# Patient Record
Sex: FEMALE | Race: WHITE | NOT HISPANIC OR LATINO | Employment: OTHER | URBAN - METROPOLITAN AREA
[De-identification: names, ages, dates, MRNs, and addresses within clinical notes are randomized per-mention and may not be internally consistent; named-entity substitution may affect disease eponyms.]

---

## 2017-08-24 ENCOUNTER — HOSPITAL ENCOUNTER (EMERGENCY)
Facility: HOSPITAL | Age: 52
Discharge: HOME | End: 2017-08-24
Attending: EMERGENCY MEDICINE | Admitting: EMERGENCY MEDICINE

## 2017-08-24 VITALS
BODY MASS INDEX: 42.34 KG/M2 | DIASTOLIC BLOOD PRESSURE: 67 MMHG | OXYGEN SATURATION: 98 % | HEIGHT: 64 IN | TEMPERATURE: 98 F | SYSTOLIC BLOOD PRESSURE: 128 MMHG | WEIGHT: 248 LBS | RESPIRATION RATE: 16 BRPM | HEART RATE: 63 BPM

## 2017-08-24 DIAGNOSIS — S51.812A LACERATION OF FOREARM, LEFT, INITIAL ENCOUNTER: Primary | ICD-10-CM

## 2017-08-24 PROCEDURE — 99282 EMERGENCY DEPT VISIT SF MDM: CPT | Mod: 25,,, | Performed by: EMERGENCY MEDICINE

## 2017-08-24 PROCEDURE — 12004 RPR S/N/AX/GEN/TRK7.6-12.5CM: CPT

## 2017-08-24 PROCEDURE — 12034 INTMD RPR S/TR/EXT 7.6-12.5: CPT | Mod: ,,, | Performed by: EMERGENCY MEDICINE

## 2017-08-24 PROCEDURE — 90715 TDAP VACCINE 7 YRS/> IM: CPT | Performed by: STUDENT IN AN ORGANIZED HEALTH CARE EDUCATION/TRAINING PROGRAM

## 2017-08-24 PROCEDURE — 99284 EMERGENCY DEPT VISIT MOD MDM: CPT | Mod: 25

## 2017-08-24 PROCEDURE — 25000003 PHARM REV CODE 250: Performed by: STUDENT IN AN ORGANIZED HEALTH CARE EDUCATION/TRAINING PROGRAM

## 2017-08-24 PROCEDURE — 90471 IMMUNIZATION ADMIN: CPT | Performed by: STUDENT IN AN ORGANIZED HEALTH CARE EDUCATION/TRAINING PROGRAM

## 2017-08-24 PROCEDURE — 63600175 PHARM REV CODE 636 W HCPCS: Performed by: STUDENT IN AN ORGANIZED HEALTH CARE EDUCATION/TRAINING PROGRAM

## 2017-08-24 RX ORDER — OXYCODONE AND ACETAMINOPHEN 5; 325 MG/1; MG/1
1 TABLET ORAL
Status: COMPLETED | OUTPATIENT
Start: 2017-08-24 | End: 2017-08-24

## 2017-08-24 RX ORDER — LIDOCAINE HYDROCHLORIDE 10 MG/ML
100 INJECTION, SOLUTION EPIDURAL; INFILTRATION; INTRACAUDAL; PERINEURAL
Status: COMPLETED | OUTPATIENT
Start: 2017-08-24 | End: 2017-08-24

## 2017-08-24 RX ADMIN — CLOSTRIDIUM TETANI TOXOID ANTIGEN (FORMALDEHYDE INACTIVATED), CORYNEBACTERIUM DIPHTHERIAE TOXOID ANTIGEN (FORMALDEHYDE INACTIVATED), BORDETELLA PERTUSSIS TOXOID ANTIGEN (GLUTARALDEHYDE INACTIVATED), BORDETELLA PERTUSSIS FILAMENTOUS HEMAGGLUTININ ANTIGEN (FORMALDEHYDE INACTIVATED), BORDETELLA PERTUSSIS PERTACTIN ANTIGEN, AND BORDETELLA PERTUSSIS FIMBRIAE 2/3 ANTIGEN 0.5 ML: 5; 2; 2.5; 5; 3; 5 INJECTION, SUSPENSION INTRAMUSCULAR at 10:08

## 2017-08-24 RX ADMIN — LIDOCAINE HYDROCHLORIDE 100 MG: 10 INJECTION, SOLUTION EPIDURAL; INFILTRATION; INTRACAUDAL; PERINEURAL at 10:08

## 2017-08-24 RX ADMIN — OXYCODONE HYDROCHLORIDE AND ACETAMINOPHEN 1 TABLET: 5; 325 TABLET ORAL at 10:08

## 2017-08-24 NOTE — ED PROVIDER NOTES
"Encounter Date: 8/24/2017    SCRIBE #1 NOTE: I, Cassi North, am scribing for, and in the presence of,  Dr. Crandall. I have scribed the following portions of the note - the Resident attestation.       History     Chief Complaint   Patient presents with    Laceration     Pt fell and caught left arm on a hook.  EMS reports large skin avulsion to left forearm.      51 y/o F no PMHx p/w laceration. Patient slipped on bath mat this morning, attempted to grab towel bar to stabalize self and cut arm on hook below as she fell.  Reports large avulsion to left arm but no other injuries. Denies numbness, cold, tingling to finger tips. Smokes "occassionally." Last tetanus "before janett." Denies LOC, h/a, LOC.           Review of patient's allergies indicates:  No Known Allergies  History reviewed. No pertinent past medical history.  No past surgical history on file.  History reviewed. No pertinent family history.  Social History   Substance Use Topics    Smoking status: Not on file    Smokeless tobacco: Not on file    Alcohol use Not on file     Review of Systems   Constitutional: Negative for fever.   HENT: Negative for sore throat.    Respiratory: Negative for shortness of breath.    Cardiovascular: Negative for chest pain.   Gastrointestinal: Negative for nausea.   Genitourinary: Negative for dysuria.   Musculoskeletal: Negative for back pain.   Skin: Positive for wound. Negative for rash.   Neurological: Negative for weakness.   Hematological: Does not bruise/bleed easily.       Physical Exam     Initial Vitals [08/24/17 1003]   BP Pulse Resp Temp SpO2   (!) 142/84 98 18 98.2 °F (36.8 °C) 100 %      MAP       103.33         Physical Exam    Nursing note and vitals reviewed.  Constitutional: She appears well-developed and well-nourished. She is not diaphoretic. No distress.   HENT:   Head: Normocephalic and atraumatic.   Right Ear: External ear normal.   Left Ear: External ear normal.   Mouth/Throat: Oropharynx is " clear and moist. No oropharyngeal exudate.   Eyes: Conjunctivae and EOM are normal. Pupils are equal, round, and reactive to light. Right eye exhibits no discharge. Left eye exhibits no discharge. No scleral icterus.   Neck: Normal range of motion. No tracheal deviation present. No JVD present.   Cardiovascular: Normal rate, regular rhythm, normal heart sounds and intact distal pulses. Exam reveals no gallop and no friction rub.    No murmur heard.  Pulmonary/Chest: Breath sounds normal. No stridor. No respiratory distress. She has no wheezes. She has no rhonchi. She has no rales.   Abdominal: Soft. She exhibits no distension. There is no tenderness.   Musculoskeletal: Normal range of motion. She exhibits no edema or tenderness.        Arms:  Neurological: She is alert and oriented to person, place, and time.   Skin: Skin is warm and dry. Capillary refill takes less than 2 seconds.         ED Course   Lac Repair  Date/Time: 8/24/2017 11:34 AM  Performed by: PARESH PHILLIP  Authorized by: COLEEN MAXWELL   Consent Done: Yes  Consent: Verbal consent obtained. Written consent not obtained.  Consent given by: patient  Patient identity confirmed: name  Body area: upper extremity  Location details: right lower arm  Laceration length: 11 cm  Foreign bodies: no foreign bodies  Tendon involvement: none  Nerve involvement: none  Vascular damage: yes  Anesthesia: local infiltration    Anesthesia:  Local Anesthetic: bupivacaine 0.25% with epinephrine  Anesthetic total: 9 mL  Patient sedated: no  Irrigation solution: saline  Irrigation method: jet lavage  Amount of cleaning: standard  Debridement: none  Degree of undermining: none  Skin closure: 3-0 Prolene  Number of sutures: 17  Technique: simple  Approximation: close  Approximation difficulty: simple  Dressing: Steri-Strips, gauze roll and pressure dressing  Patient tolerance: Patient tolerated the procedure well with no immediate complications        Labs Reviewed - No  data to display          Medical Decision Making:   Initial Assessment:   51 y/o F presents with laceration to left arm, no other injuries, neurovascularly in tact, no tendon injury noted. Will update Tetanus, provide pain relief, rinse, numb, repair.     Booker Tejada MD [facility]  10:37 AM 8/24/2017    ED Management:  Laceration repaired using sutures without issue (see procedure note). Discharged with strict return precautions, f/u to have wound check.  Patient expressed understanding, released into care of family.    Booker Tejada MD [facility]  11:35 AM 8/24/2017              Scribe Attestation:   Scribe #1: I performed the above scribed service and the documentation accurately describes the services I performed. I attest to the accuracy of the note.    Attending Attestation:   Physician Attestation Statement for Resident:  As the supervising MD   Physician Attestation Statement: I have personally seen and examined this patient.   I agree with the above history. -: Pt presents w/ laceration to the L forearm. Tetanus is not updated and therefore will be given. Pt has a 6 inch laceration to the L forearm w/ soft tissue and fat exposed. There does not appear to be any muscle injury. Full ROM of hand and fingers. Pulses intact.   As the supervising MD I agree with the above PE.    As the supervising MD I agree with the above treatment, course, plan, and disposition.          Physician Attestation for Scribe:  Physician Attestation Statement for Scribe #1: I, Dr. Crandall, reviewed documentation, as scribed by Cassi North in my presence, and it is both accurate and complete.                 ED Course     Clinical Impression:   The encounter diagnosis was Laceration of forearm, left, initial encounter.  Laceration to forearm                       Booker Tejada MD  Resident  08/24/17 [id number]

## 2017-08-24 NOTE — ED NOTES
Rounding on the patient has been done. The patient has been updated on the plan of care and current status. Pain was assessed and is currently a 0/10. Comfort positioning and restroom needs were addressed. Necessary items were placed with in reach and was advised when a reassessment would take place. The call bell remains at the bedside for any additional patient needs. The patient is resting comfortably on the stretcher, respirations are even and unlabored, skin warm and dry. Will continue to monitor. Pt's cousin at bedside. Laceration repair complete. Wound sutured, dressing place by physician.  Pt tolerated well.

## 2017-08-24 NOTE — ED NOTES
Patient identifiers verified and correct for Nayan Rivera.    LOC: The patient is awake, alert and oriented x 4. Pt is speaking appropriately, no slurred speech.  APPEARANCE: Patient resting comfortably and in no acute distress. Pt is clean and well groomed. No JVD visible.   SKIN: Skin is warm dry and intact, and color is consistent with ethnicity. No tenting observed and capillary refill <3 seconds. No clubbing noted to nail beds. 11cm long, full thickness laceration to left forearm, adipose tissue exposed. mucus membranes moist and acyanotic.  MUSCULOSKELETAL: Full range of motion present in all extremities. Hand  equal and leg strength strong +2 bilaterally.  RESPIRATORY: Airway is open and patent. Respirations-unlabored, regular rate, equal bilaterally on inspiration and expiration. No accessory muscle use noted. Lungs clear to auscultation in all fields bilaterally anterior and posterior.   CARDIAC: Patient has regular heart rate and rhythm.  No peripheral edema noted, and patient has no c/o chest pain.  ABDOMEN: Soft and non-tender to palpation with no distention noted. Normoactive bowel sounds X4 quadrants.   NEUROLOGIC: Eyes open spontaneously and facial expression symmetrical. Pt behavior appropriate to situation, and pt follows commands.  Pt reports sensation present in all extremities when touched with a finger.   : No complaints of frequency, burning, urgency or blood in the urine.

## 2017-08-24 NOTE — ED TRIAGE NOTES
"Pt reports slipping on rug and  "catching my arm on the hook of the bar" a few hours ago. Pt has large laceration to left forearm.  Pt reports 2/10 pain.   "

## 2017-08-24 NOTE — DISCHARGE INSTRUCTIONS
Take off dressing tomorrow  Wash gently once a day and apply antibiotic lotion  Begin gently rubbing with alcohol on day 5 to clean clots  Have sutures re-examined in 10 days by medical professional, will be removed between 10-14 days  Return for any warmth, discharge, redness, fevers/chills

## 2017-09-02 ENCOUNTER — HOSPITAL ENCOUNTER (EMERGENCY)
Facility: HOSPITAL | Age: 52
Discharge: HOME | End: 2017-09-02
Attending: EMERGENCY MEDICINE

## 2017-09-02 VITALS
OXYGEN SATURATION: 98 % | BODY MASS INDEX: 40.97 KG/M2 | HEIGHT: 64 IN | DIASTOLIC BLOOD PRESSURE: 64 MMHG | TEMPERATURE: 98 F | HEART RATE: 64 BPM | RESPIRATION RATE: 16 BRPM | WEIGHT: 240 LBS | SYSTOLIC BLOOD PRESSURE: 129 MMHG

## 2017-09-02 DIAGNOSIS — L08.9 INFECTED LACERATION: Primary | ICD-10-CM

## 2017-09-02 DIAGNOSIS — T14.8XXA INFECTED LACERATION: Primary | ICD-10-CM

## 2017-09-02 PROCEDURE — 63600175 PHARM REV CODE 636 W HCPCS: Performed by: EMERGENCY MEDICINE

## 2017-09-02 PROCEDURE — 99283 EMERGENCY DEPT VISIT LOW MDM: CPT | Mod: ,,, | Performed by: EMERGENCY MEDICINE

## 2017-09-02 PROCEDURE — 99283 EMERGENCY DEPT VISIT LOW MDM: CPT | Mod: 25

## 2017-09-02 PROCEDURE — 96365 THER/PROPH/DIAG IV INF INIT: CPT

## 2017-09-02 RX ORDER — CEFAZOLIN SODIUM 2 G/50ML
2 SOLUTION INTRAVENOUS
Status: COMPLETED | OUTPATIENT
Start: 2017-09-02 | End: 2017-09-02

## 2017-09-02 RX ORDER — CEPHALEXIN 500 MG/1
500 CAPSULE ORAL 4 TIMES DAILY
Qty: 28 CAPSULE | Refills: 0 | Status: SHIPPED | OUTPATIENT
Start: 2017-09-02 | End: 2017-09-07

## 2017-09-02 RX ORDER — CEPHALEXIN 250 MG/1
500 CAPSULE ORAL 4 TIMES DAILY
Qty: 28 CAPSULE | Refills: 0 | Status: SHIPPED | OUTPATIENT
Start: 2017-09-02 | End: 2017-09-02

## 2017-09-02 RX ADMIN — CEFAZOLIN SODIUM 2 G: 2 SOLUTION INTRAVENOUS at 11:09

## 2017-09-02 NOTE — DISCHARGE INSTRUCTIONS
Take the antibiotics prescribed  Return in 3 days for recheck and possible removal of the stitches

## 2017-09-02 NOTE — ED PROVIDER NOTES
Encounter Date: 9/2/2017    SCRIBE #1 NOTE: I, Sonam Newberry, am scribing for, and in the presence of,  Dr. Fuller . I have scribed the entire note.       History     Chief Complaint   Patient presents with    Wound Infection     had stitches placed aug 24 now red and swollen     Time patient was seen by the provider: 10:44 AM    The patient is a 52 y.o. female with hx of: endocarditis that presents to the ED with a complaint of a wound infection on her left forearm. Pt states she had stiches placed on August 24 and was told to return to the ED if she saw erythema or if she was in pain . She denies being in pain right now, fever, chills, and being on antibiotics. Of note: Pt has NKDA.           The history is provided by the patient.     Review of patient's allergies indicates:   Allergen Reactions    Lactose intolerance [lactase]      Past Medical History:   Diagnosis Date    Endocarditis      History reviewed. No pertinent surgical history.  History reviewed. No pertinent family history.  Social History   Substance Use Topics    Smoking status: Never Smoker    Smokeless tobacco: Never Used    Alcohol use Yes      Comment: social     Review of Systems   Constitutional: Negative for fever.   HENT: Negative for sore throat.    Respiratory: Negative for shortness of breath.    Cardiovascular: Negative for chest pain.   Gastrointestinal: Negative for nausea.   Genitourinary: Negative for dysuria.   Musculoskeletal: Negative for back pain.   Skin: Positive for wound (on her left forearm ). Negative for rash.   Neurological: Negative for weakness.   Hematological: Does not bruise/bleed easily.       Physical Exam     Initial Vitals [09/02/17 0947]   BP Pulse Resp Temp SpO2   (!) 169/79 79 18 99 °F (37.2 °C) 99 %      MAP       109         Physical Exam    Nursing note and vitals reviewed.  Constitutional: She appears well-developed and well-nourished. No distress.   Skin:   Pt has a v-shaped wound that has  been sutured with some localized bruising, but more importantly there is warmth and erythema.          ED Course   Procedures  Labs Reviewed - No data to display          Medical Decision Making:   History:   Old Medical Records: I decided to obtain old medical records.            Scribe Attestation:   Scribe #1: I performed the above scribed service and the documentation accurately describes the services I performed. I attest to the accuracy of the note.    Attending Attestation:           Physician Attestation for Scribe:  Physician Attestation Statement for Scribe #1: I, Dr. Fuller , reviewed documentation, as scribed by Sonam Newberry  in my presence, and it is both accurate and complete.         Attending ED Notes:   Patient with an infected sutured laceration; not ready to take out the stitches.   Paiient given IV antibiotics and will be placed on oral antibiotics and will be rechecked here in this ED in several days in hopes of improvement          ED Course      Clinical Impression:   The encounter diagnosis was Infected laceration.    Disposition:   Disposition: Discharged  Condition: Stable                        Gareth Fuller MD  09/18/17 [id number]

## 2017-09-05 ENCOUNTER — HOSPITAL ENCOUNTER (EMERGENCY)
Facility: HOSPITAL | Age: 52
Discharge: HOME | End: 2017-09-05
Attending: FAMILY MEDICINE | Admitting: FAMILY MEDICINE

## 2017-09-05 VITALS
RESPIRATION RATE: 16 BRPM | WEIGHT: 240 LBS | HEART RATE: 70 BPM | OXYGEN SATURATION: 99 % | SYSTOLIC BLOOD PRESSURE: 118 MMHG | BODY MASS INDEX: 40.97 KG/M2 | HEIGHT: 64 IN | DIASTOLIC BLOOD PRESSURE: 91 MMHG | TEMPERATURE: 99 F

## 2017-09-05 DIAGNOSIS — Z48.02 ENCOUNTER FOR REMOVAL OF SUTURES: ICD-10-CM

## 2017-09-05 DIAGNOSIS — Z51.89 ENCOUNTER FOR WOUND RE-CHECK: Primary | ICD-10-CM

## 2017-09-05 PROCEDURE — 99283 EMERGENCY DEPT VISIT LOW MDM: CPT

## 2017-09-05 PROCEDURE — 87077 CULTURE AEROBIC IDENTIFY: CPT

## 2017-09-05 PROCEDURE — 87070 CULTURE OTHR SPECIMN AEROBIC: CPT

## 2017-09-05 PROCEDURE — 87186 SC STD MICRODIL/AGAR DIL: CPT

## 2017-09-05 PROCEDURE — 99024 POSTOP FOLLOW-UP VISIT: CPT | Mod: ,,, | Performed by: PHYSICIAN ASSISTANT

## 2017-09-05 NOTE — ED NOTES
Patient stable, discharge instruction given and patient verb understanding. Walked to check out area with green discharge folder.

## 2017-09-05 NOTE — ED NOTES
Pt identifiers Nayan Rivera was checked and is correct  LOC: The patient is awake, alert, aware of environment with an appropriate affect.   APPEARANCE: Pt resting comfortably, in no acute distress  SKIN: Skin warm, dry and intact, normal skin turgor, moist mucus membranes, sutures noted to left forearm.  Bruising noted.  Per patient, bruising is better than other day.    RESPIRATORY: Airway is open and patent, respirations are spontaneous, even and unlabored, normal effort and rate  CARDIAC:  Denies chest pain,  no peripheral edema noted,   ABDOMEN: Soft, nontender, nondistended. Bowel sounds present   NEUROLOGIC:  patient moving all extremities spontaneously, Follows all commands appropriately  MUSCULOSKELETAL: No obvious deformities.

## 2017-09-05 NOTE — ED PROVIDER NOTES
Encounter Date: 9/5/2017       History     Chief Complaint   Patient presents with    Wound Check     seen here 3 days ago on antibiotics, has sutures to L forearm placed aug 24     52-year-old female with a history of endocarditis presents to the ED for a wound check.  Patient sustained a large laceration to her left forearm on 8/24.  She presented to this ED where the wound was repaired.  She subsequently returns to the ED 3 days ago on 9/2 with concerns of wound infection.  She was placed on Keflex at that time.  She has taken 3 days worth of antibiotics.  She reports decrease in the area of erythema surrounding the wound, but feels that there may be some bruising that has increased.  She denies fevers, chills, numbness.           Review of patient's allergies indicates:   Allergen Reactions    Lactose intolerance [lactase]      Past Medical History:   Diagnosis Date    Endocarditis      No past surgical history on file.  No family history on file.  Social History   Substance Use Topics    Smoking status: Never Smoker    Smokeless tobacco: Never Used    Alcohol use Yes      Comment: social     Review of Systems   Constitutional: Negative for chills and fever.   Skin: Positive for wound.       Physical Exam     Initial Vitals [09/05/17 1253]   BP Pulse Resp Temp SpO2   (!) 165/78 69 18 98.3 °F (36.8 °C) 99 %      MAP       107         Physical Exam    Nursing note and vitals reviewed.  Constitutional: She appears well-developed and well-nourished. She is not diaphoretic.  Non-toxic appearance. She does not appear ill. No distress.   HENT:   Head: Normocephalic and atraumatic.   Neck: Neck supple.   Cardiovascular: Normal rate and regular rhythm. Exam reveals no gallop and no friction rub.    No murmur heard.  Pulmonary/Chest: Effort normal and breath sounds normal. No accessory muscle usage. No tachypnea. No respiratory distress. She has no decreased breath sounds. She has no wheezes. She has no rhonchi. She  has no rales.   Abdominal: Normal appearance. She exhibits no distension.   Musculoskeletal: Normal range of motion.   Neurological: She is alert and oriented to person, place, and time.   Skin: Skin is warm and dry. No rash noted. No pallor.        Large laceration noted to the L volar forearm with multiple sutures in place. There is an area of erythema distal to the laceration without warmth or significant tenderness.  Serosanguineous drainage expressed.  Normal sensation of skin.    Psychiatric: She has a normal mood and affect. Her behavior is normal. Judgment and thought content normal.         ED Course   Suture Removal  Date/Time: 9/5/2017 8:52 PM  Performed by: ARY NORIEGA  Authorized by: ARUNA LEMOS   Body area: upper extremity  Location details: left lower arm  Wound Appearance: clean and nonpurulent  Sutures Removed: 17  Post-removal: dressing applied  Facility: sutures placed in this facility  Patient tolerance: Patient tolerated the procedure well with no immediate complications        Labs Reviewed   CULTURE, AEROBIC  (SPECIFY SOURCE)                   APC / Resident Notes:   52-year-old female presents for wound check to laceration sustained approximately 12 days ago.  Sutures were placed in this facility. There is an area of erythema distal to the laceration without warmth or significant tenderness.  Serosanguineous drainage expressed.  Wound culture was obtained.  Patient is currently on Keflex.    The sutures were removed, dressing applied. Pt will be discharged in stable condition.  I do not feel that further workup is indicated as patient's wound has improved and she is nontender to the erythematous areas.  Patient instructed to follow up with wound care clinic for further monitoring of healing process. Return precautions given. I have reviewed the patient's records and discussed this case with my supervising physician.                ED Course      Clinical Impression:   The primary  encounter diagnosis was Encounter for wound re-check. A diagnosis of Encounter for removal of sutures was also pertinent to this visit.    Disposition:   Disposition: Discharged  Condition: Stable                        Nasreen Herrera PA-C  09/05/17 2054

## 2017-09-05 NOTE — ED TRIAGE NOTES
Here for wound check, pt placed on abx 3 days ago.  No  Redness or drainage.  No tenderness or fever. Pt states arm looks better than it did three days ago.

## 2017-09-07 LAB — BACTERIA SPEC AEROBE CULT: NORMAL

## 2018-09-21 ENCOUNTER — OFFICE VISIT (OUTPATIENT)
Dept: URGENT CARE | Facility: CLINIC | Age: 53
End: 2018-09-21

## 2018-09-21 VITALS
WEIGHT: 250 LBS | RESPIRATION RATE: 18 BRPM | HEIGHT: 64 IN | DIASTOLIC BLOOD PRESSURE: 91 MMHG | SYSTOLIC BLOOD PRESSURE: 168 MMHG | BODY MASS INDEX: 42.68 KG/M2 | HEART RATE: 77 BPM | OXYGEN SATURATION: 99 % | TEMPERATURE: 99 F

## 2018-09-21 DIAGNOSIS — J01.00 ACUTE MAXILLARY SINUSITIS, RECURRENCE NOT SPECIFIED: Primary | ICD-10-CM

## 2018-09-21 PROCEDURE — 99203 OFFICE O/P NEW LOW 30 MIN: CPT | Mod: 25,S$GLB,, | Performed by: NURSE PRACTITIONER

## 2018-09-21 PROCEDURE — 96372 THER/PROPH/DIAG INJ SC/IM: CPT | Mod: S$GLB,,, | Performed by: NURSE PRACTITIONER

## 2018-09-21 RX ORDER — AMOXICILLIN 875 MG/1
875 TABLET, FILM COATED ORAL 2 TIMES DAILY
Qty: 20 TABLET | Refills: 0 | Status: SHIPPED | OUTPATIENT
Start: 2018-09-21 | End: 2018-10-01

## 2018-09-21 RX ORDER — BETAMETHASONE SODIUM PHOSPHATE AND BETAMETHASONE ACETATE 3; 3 MG/ML; MG/ML
6 INJECTION, SUSPENSION INTRA-ARTICULAR; INTRALESIONAL; INTRAMUSCULAR; SOFT TISSUE
Status: COMPLETED | OUTPATIENT
Start: 2018-09-21 | End: 2018-09-21

## 2018-09-21 RX ADMIN — BETAMETHASONE SODIUM PHOSPHATE AND BETAMETHASONE ACETATE 6 MG: 3; 3 INJECTION, SUSPENSION INTRA-ARTICULAR; INTRALESIONAL; INTRAMUSCULAR; SOFT TISSUE at 09:09

## 2018-09-21 NOTE — PATIENT INSTRUCTIONS
Follow up with your doctor in a few days.  Return to the urgent care or go to the ER if symptoms get worse.    You received a steroid shot today - this can elevate your blood pressure, elevate your blood sugar, water weight gain, nervous energy, redness to the face and dimpling of the skin where the shot goes in.       Start taking antibiotics if not improved in 3 days     Zyrtec, Claritin, or Allegra OTC as directed for the next 7 days  Add a decongestant to your antihistamine for congestion- like Zyrtec-D, Claritin D, Allegra D-this may increase your blood pressure.   If high blood pressure, an alternative decongestant is Coricidin HBP   Flonase OTC as directed for the next 7 days  Salt Water Nasal Spray OTC 3x/day for the next 7 days        Follow up with Primary Care or ENT if not improved in 7-10 Days              Sinusitis (Wait and See Antibiotics)    The sinuses are air-filled spaces within the bones of the face. They connect to the inside of the nose. Sinusitis is an inflammation of the tissue lining the sinus cavity. Sinus inflammation can occur during a cold. It can also be due to allergies to pollens and other particles in the air. Sinusitis can cause symptoms of sinus congestion and fullness. There is often green or yellow drainage from the nose or into the back of the throat (post-nasal drip). This condition may advance to an infection if continues. You have been given wait and see antibiotics to treat this condition.    Please return here or go to the Emergency Department for any concerns or worsening of condition.    Home care:  · Get rest!  · We discussed that your illness is probably viral and you will not respond to antibiotics. This course should last 10-14 days. If symptoms have not improved or worsened over the next 3-5 days, please start the wait and see antibiotics. Please take the antibiotics until completion.  · Drink plenty of water, hot tea, and other liquids. This may help thin mucus. It  "also may promote sinus drainage.  · Heat may help soothe painful areas of the face. Use a towel soaked in hot water. Or,  the shower and direct the hot spray onto your face. Using a vaporizer along with a menthol rub at night may also help.   · An expectorant containing guaifenesin may help thin the mucus and promote drainage from the sinuses.  · If you are a female and on birth control pills and take the antibiotics, use additional methods to prevent pregnancy while on the antibiotics and for one cycle after.  · Flonase (fluticasone) is an over the counter nasal spray which may help with your symptoms.  · Zyrtec D, Claritin D, or Allegra D can also help with symptoms of congestion and drainage  · If you have hypertesion, avoid using the "D" which is a decongestant.  · If clear drainage, you can take plain zyrtec, claritin, allegra.  · If congested, you can take pseudoephedrine-you need to ask for this behind the pharmacy counter-do not take if you have high blood pressure. Pheylephrine is on the shelf and is not effective.  · Tylenol or ibuprofen can be used as directed for pain, unless you have allergies. Avoid ibuprofen if medical conditions such as stomach ulcers, kidney or liver disease, or blood thinners  · Afrin is effective if flying in the next few days. Take as directed for the airplane flight upon taking off and landing. Do not continue to use Afrin as it will cause rebound congestion.  · Don't smoke. This can worsen symptoms.  · If you are a female and on birth control pills and take the antibiotics, use additional methods to prevent pregnancy while on the antibiotics and for one cycle after.  ·   Follow-up care  Follow up with your healthcare provider or our staff if you are not improving within the next week.    When to seek medical advice  Call your healthcare provider if any of these occur:  · Facial pain or headache becoming more severe  · Stiff neck  · Unusual drowsiness or " confusion  · Swelling of the forehead or eyelids  · Vision problems, including blurred or double vision  · Fever of 100.4ºF (38ºC) or higher, or as directed by your healthcare provider  · Seizure  · Breathing problems  · Symptoms not resolving within 10 days

## 2018-09-21 NOTE — LETTER
September 21, 2018      [facility]  [address] [place] [address]  Phone: [phone]  Fax: [phone]       Patient: Nayan Rivera   YOB: 1965  Date of Visit: 09/21/2018    To Whom It May Concern:    Valarie Rivera  was at [facility] on 09/21/2018. She may return to work/school on 9/24/2018 with no restrictions. If you have any questions or concerns, or if I can be of further assistance, please do not hesitate to contact me.    Sincerely,    Renetta De Santiago NP

## 2018-09-21 NOTE — PROGRESS NOTES
"Subjective:       Patient ID: Nayan Rivera is a 53 y.o. female.    Vitals:  height is 5' 4" (1.626 m) and weight is 113.4 kg (250 lb). Her oral temperature is 98.7 °F (37.1 °C). Her blood pressure is 168/91 (abnormal) and her pulse is 77. Her respiration is 18 and oxygen saturation is 99%.     Chief Complaint: Sinus Problem    Sinus Problem   This is a new problem. The current episode started yesterday. The problem has been gradually worsening since onset. There has been no fever. Her pain is at a severity of 1/10. The pain is mild. Associated symptoms include congestion, coughing, a hoarse voice, sinus pressure and sneezing. Pertinent negatives include no chills, diaphoresis, ear pain, headaches, neck pain, shortness of breath, sore throat or swollen glands. Treatments tried: mucinex. The treatment provided no relief.     Review of Systems   Constitution: Negative for chills, diaphoresis, fever and malaise/fatigue.   HENT: Positive for congestion, hoarse voice, sinus pressure and sneezing. Negative for ear discharge, ear pain and sore throat.    Eyes: Negative for discharge and redness.   Cardiovascular: Negative for chest pain, dyspnea on exertion and leg swelling.   Respiratory: Positive for cough and sputum production. Negative for shortness of breath and wheezing.    Musculoskeletal: Negative for myalgias and neck pain.   Gastrointestinal: Negative for abdominal pain, constipation, diarrhea, nausea and vomiting.   Neurological: Negative for headaches.       Objective:      Physical Exam   Constitutional: She is oriented to person, place, and time. She appears well-developed and well-nourished. She is cooperative.  Non-toxic appearance. She does not appear ill. No distress.   HENT:   Head: Normocephalic and atraumatic.   Right Ear: Hearing, external ear and ear canal normal. There is tenderness. Tympanic membrane is erythematous. A middle ear effusion is present.   Left Ear: Hearing, tympanic membrane, " external ear and ear canal normal.   Nose: Mucosal edema and rhinorrhea present. No nasal deformity. No epistaxis. Right sinus exhibits maxillary sinus tenderness and frontal sinus tenderness. Left sinus exhibits no maxillary sinus tenderness and no frontal sinus tenderness.   Mouth/Throat: Uvula is midline, oropharynx is clear and moist and mucous membranes are normal. No trismus in the jaw. Normal dentition. No uvula swelling. No posterior oropharyngeal erythema.   Eyes: Conjunctivae and lids are normal. No scleral icterus.   Sclera clear bilat   Neck: Trachea normal, full passive range of motion without pain and phonation normal. Neck supple.   Cardiovascular: Normal rate, regular rhythm, normal heart sounds, intact distal pulses and normal pulses.   Pulmonary/Chest: Effort normal and breath sounds normal. No respiratory distress.   Abdominal: Soft. Normal appearance and bowel sounds are normal. She exhibits no distension. There is no tenderness.   Musculoskeletal: Normal range of motion. She exhibits no edema or deformity.   Neurological: She is alert and oriented to person, place, and time. She exhibits normal muscle tone. Coordination normal.   Skin: Skin is warm, dry and intact. She is not diaphoretic. No pallor.   Psychiatric: She has a normal mood and affect. Her speech is normal and behavior is normal. Judgment and thought content normal. Cognition and memory are normal.   Nursing note and vitals reviewed.      Assessment:       1. Acute maxillary sinusitis, recurrence not specified        Plan:         Acute maxillary sinusitis, recurrence not specified  -     betamethasone acetate-betamethasone sodium phosphate injection 6 mg; Inject 1 mL (6 mg total) into the muscle one time.  -     amoxicillin (AMOXIL) 875 MG tablet; Take 1 tablet (875 mg total) by mouth 2 (two) times daily. for 10 days  Dispense: 20 tablet; Refill: 0      Patient Instructions   Follow up with your doctor in a few days.  Return to the  urgent care or go to the ER if symptoms get worse.    You received a steroid shot today - this can elevate your blood pressure, elevate your blood sugar, water weight gain, nervous energy, redness to the face and dimpling of the skin where the shot goes in.       Start taking antibiotics if not improved in 3 days     Zyrtec, Claritin, or Allegra OTC as directed for the next 7 days  Add a decongestant to your antihistamine for congestion- like Zyrtec-D, Claritin D, Allegra D-this may increase your blood pressure.   If high blood pressure, an alternative decongestant is Coricidin HBP   Flonase OTC as directed for the next 7 days  Salt Water Nasal Spray OTC 3x/day for the next 7 days        Follow up with Primary Care or ENT if not improved in 7-10 Days              Sinusitis (Wait and See Antibiotics)    The sinuses are air-filled spaces within the bones of the face. They connect to the inside of the nose. Sinusitis is an inflammation of the tissue lining the sinus cavity. Sinus inflammation can occur during a cold. It can also be due to allergies to pollens and other particles in the air. Sinusitis can cause symptoms of sinus congestion and fullness. There is often green or yellow drainage from the nose or into the back of the throat (post-nasal drip). This condition may advance to an infection if continues. You have been given wait and see antibiotics to treat this condition.    Please return here or go to the Emergency Department for any concerns or worsening of condition.    Home care:  · Get rest!  · We discussed that your illness is probably viral and you will not respond to antibiotics. This course should last 10-14 days. If symptoms have not improved or worsened over the next 3-5 days, please start the wait and see antibiotics. Please take the antibiotics until completion.  · Drink plenty of water, hot tea, and other liquids. This may help thin mucus. It also may promote sinus drainage.  · Heat may help soothe  "painful areas of the face. Use a towel soaked in hot water. Or,  the shower and direct the hot spray onto your face. Using a vaporizer along with a menthol rub at night may also help.   · An expectorant containing guaifenesin may help thin the mucus and promote drainage from the sinuses.  · If you are a female and on birth control pills and take the antibiotics, use additional methods to prevent pregnancy while on the antibiotics and for one cycle after.  · Flonase (fluticasone) is an over the counter nasal spray which may help with your symptoms.  · Zyrtec D, Claritin D, or Allegra D can also help with symptoms of congestion and drainage  · If you have hypertesion, avoid using the "D" which is a decongestant.  · If clear drainage, you can take plain zyrtec, claritin, allegra.  · If congested, you can take pseudoephedrine-you need to ask for this behind the pharmacy counter-do not take if you have high blood pressure. Pheylephrine is on the shelf and is not effective.  · Tylenol or ibuprofen can be used as directed for pain, unless you have allergies. Avoid ibuprofen if medical conditions such as stomach ulcers, kidney or liver disease, or blood thinners  · Afrin is effective if flying in the next few days. Take as directed for the airplane flight upon taking off and landing. Do not continue to use Afrin as it will cause rebound congestion.  · Don't smoke. This can worsen symptoms.  · If you are a female and on birth control pills and take the antibiotics, use additional methods to prevent pregnancy while on the antibiotics and for one cycle after.  ·   Follow-up care  Follow up with your healthcare provider or our staff if you are not improving within the next week.    When to seek medical advice  Call your healthcare provider if any of these occur:  · Facial pain or headache becoming more severe  · Stiff neck  · Unusual drowsiness or confusion  · Swelling of the forehead or eyelids  · Vision problems, " including blurred or double vision  · Fever of 100.4ºF (38ºC) or higher, or as directed by your healthcare provider  · Seizure  · Breathing problems  · Symptoms not resolving within 10 days

## 2020-06-17 ENCOUNTER — OCCUPATIONAL HEALTH (OUTPATIENT)
Dept: URGENT CARE | Facility: CLINIC | Age: 55
End: 2020-06-17

## 2020-06-17 DIAGNOSIS — Z11.9 ENCOUNTER FOR SCREENING EXAMINATION FOR INFECTIOUS DISEASE: Primary | ICD-10-CM

## 2020-06-17 PROCEDURE — U0003 INFECTIOUS AGENT DETECTION BY NUCLEIC ACID (DNA OR RNA); SEVERE ACUTE RESPIRATORY SYNDROME CORONAVIRUS 2 (SARS-COV-2) (CORONAVIRUS DISEASE [COVID-19]), AMPLIFIED PROBE TECHNIQUE, MAKING USE OF HIGH THROUGHPUT TECHNOLOGIES AS DESCRIBED BY CMS-2020-01-R: HCPCS

## 2020-06-18 LAB — SARS-COV-2 RNA RESP QL NAA+PROBE: NOT DETECTED

## 2020-06-19 ENCOUNTER — TELEPHONE (OUTPATIENT)
Dept: URGENT CARE | Facility: CLINIC | Age: 55
End: 2020-06-19

## 2020-06-19 NOTE — TELEPHONE ENCOUNTER
Patient advised of negative covid results. Patient verbalized understanding and no other issues were discussed.

## 2020-06-19 NOTE — TELEPHONE ENCOUNTER
----- Message from Rica Coronado PA-C sent at 6/19/2020  9:16 AM CDT -----  Please let patient know of negative COVID-19 testing result.  Thanks.

## 2021-03-20 ENCOUNTER — IMMUNIZATION (OUTPATIENT)
Dept: PRIMARY CARE CLINIC | Facility: CLINIC | Age: 56
End: 2021-03-20

## 2021-03-20 DIAGNOSIS — Z23 NEED FOR VACCINATION: Primary | ICD-10-CM

## 2021-03-20 PROCEDURE — 91300 PR SARS-COV- 2 COVID-19 VACCINE, NO PRSV, 30MCG/0.3ML, IM: CPT | Mod: S$GLB,,, | Performed by: INTERNAL MEDICINE

## 2021-03-20 PROCEDURE — 91300 PR SARS-COV- 2 COVID-19 VACCINE, NO PRSV, 30MCG/0.3ML, IM: ICD-10-PCS | Mod: S$GLB,,, | Performed by: INTERNAL MEDICINE

## 2021-03-20 PROCEDURE — 0001A PR IMMUNIZ ADMIN, SARS-COV-2 COVID-19 VACC, 30MCG/0.3ML, 1ST DOSE: CPT | Mod: CV19,S$GLB,, | Performed by: INTERNAL MEDICINE

## 2021-03-20 PROCEDURE — 0001A PR IMMUNIZ ADMIN, SARS-COV-2 COVID-19 VACC, 30MCG/0.3ML, 1ST DOSE: ICD-10-PCS | Mod: CV19,S$GLB,, | Performed by: INTERNAL MEDICINE

## 2021-03-20 RX ADMIN — Medication 0.3 ML: at 10:03

## 2021-04-11 ENCOUNTER — IMMUNIZATION (OUTPATIENT)
Dept: PRIMARY CARE CLINIC | Facility: CLINIC | Age: 56
End: 2021-04-11

## 2021-04-11 DIAGNOSIS — Z23 NEED FOR VACCINATION: Primary | ICD-10-CM

## 2021-04-11 PROCEDURE — 0002A PR IMMUNIZ ADMIN, SARS-COV-2 COVID-19 VACC, 30MCG/0.3ML, 2ND DOSE: CPT | Mod: CV19,S$GLB,, | Performed by: INTERNAL MEDICINE

## 2021-04-11 PROCEDURE — 0002A PR IMMUNIZ ADMIN, SARS-COV-2 COVID-19 VACC, 30MCG/0.3ML, 2ND DOSE: ICD-10-PCS | Mod: CV19,S$GLB,, | Performed by: INTERNAL MEDICINE

## 2021-04-11 PROCEDURE — 91300 PR SARS-COV- 2 COVID-19 VACCINE, NO PRSV, 30MCG/0.3ML, IM: CPT | Mod: S$GLB,,, | Performed by: INTERNAL MEDICINE

## 2021-04-11 PROCEDURE — 91300 PR SARS-COV- 2 COVID-19 VACCINE, NO PRSV, 30MCG/0.3ML, IM: ICD-10-PCS | Mod: S$GLB,,, | Performed by: INTERNAL MEDICINE

## 2021-04-11 RX ADMIN — Medication 0.3 ML: at 09:04

## 2022-01-30 ENCOUNTER — OFFICE VISIT (OUTPATIENT)
Dept: URGENT CARE | Facility: CLINIC | Age: 57
End: 2022-01-30

## 2022-01-30 VITALS
OXYGEN SATURATION: 98 % | RESPIRATION RATE: 15 BRPM | HEART RATE: 72 BPM | TEMPERATURE: 99 F | DIASTOLIC BLOOD PRESSURE: 82 MMHG | SYSTOLIC BLOOD PRESSURE: 164 MMHG

## 2022-01-30 DIAGNOSIS — K04.7 DENTAL INFECTION: Primary | ICD-10-CM

## 2022-01-30 DIAGNOSIS — K08.89 PAIN, DENTAL: ICD-10-CM

## 2022-01-30 PROCEDURE — 99214 PR OFFICE/OUTPT VISIT, EST, LEVL IV, 30-39 MIN: ICD-10-PCS | Mod: TIER,S$GLB,, | Performed by: NURSE PRACTITIONER

## 2022-01-30 PROCEDURE — 99214 OFFICE O/P EST MOD 30 MIN: CPT | Mod: TIER,S$GLB,, | Performed by: NURSE PRACTITIONER

## 2022-01-30 RX ORDER — NAPROXEN 500 MG/1
500 TABLET ORAL EVERY 12 HOURS PRN
Qty: 20 TABLET | Refills: 0 | Status: SHIPPED | OUTPATIENT
Start: 2022-01-30 | End: 2022-02-09

## 2022-01-30 RX ORDER — AMOXICILLIN AND CLAVULANATE POTASSIUM 875; 125 MG/1; MG/1
1 TABLET, FILM COATED ORAL EVERY 12 HOURS
Qty: 14 TABLET | Refills: 0 | Status: SHIPPED | OUTPATIENT
Start: 2022-01-30 | End: 2022-02-06

## 2022-01-30 NOTE — PATIENT INSTRUCTIONS
Please follow up with your dentist within 7 days.    If your condition worsens or fails to improve we recommend that you receive another evaluation at the emergency room immediately or contact your primary medical clinic to discuss your concerns.     You must understand that you have received an Urgent Care treatment only and that you may be released before all of your medical problems are known or treated. You, the patient, will arrange for follow up care as instructed.     You received a prescription for a powerful NSAID today (Naproxen).  Please do not take another NSAID (ie aspirin, ibuprofen, Aleve, Advil or Motrin) while on this medication.  Take with meals if possible to avoid any GI problems.  You may take OTC Prilosec or Nexium to help reduce the chances of GI side effects.  If you continue to have pain, you may take Tylenol (acetaminophen) if you are not allergic to this medication.      Patient Education       Tooth Abscess ED   General Information   You came to the Emergency Department (ED) for a tooth abscess. An abscess is a collection of pus from an infection. You may have had an injury to a tooth, unhealthy gums, or tooth decay that led to your abscess. You may need a root canal or to have your tooth pulled because of an abscess. You will need antibiotics to treat the infection. It is important to take all of your antibiotics even if you start to feel better.  What care is needed at home?   · Call your regular dentist to let them know you were in the ED. Make a follow-up appointment with a dentist if you were told to.  · Avoid very cold or very hot food and drinks. These can make pain worse.  · If you smoke, try to quit. Your doctor or nurse can help.  · You may want to take medicines like ibuprofen, naproxen, or acetaminophen to help with pain.  · Brush your teeth at least 2 times a day. Use toothpaste with fluoride.  · Use dental floss to clean between your teeth every day.  When do I need to get  emergency help?   · Return to the ED if:   ? You have a fever of 100.4°F (38°C) or higher or chills.  ? You have swelling or pain in your neck or throat.  ? You are not able to open your mouth or eat.  ? You have trouble breathing.  When do I need to call the doctor?   · You have very bad pain that is not helped by pain medicines.  · You have swelling in your gums or face.  · You have discharge around the tooth.  · You have a bad taste in your mouth.  · You have lots of bleeding from the gums.  · You have more pain after having a tooth pulled.  · You have new or worsening symptoms.  Last Reviewed Date   2021-04-26  Consumer Information Use and Disclaimer   This information is not specific medical advice and does not replace information you receive from your health care provider. This is only a brief summary of general information. It does NOT include all information about conditions, illnesses, injuries, tests, procedures, treatments, therapies, discharge instructions or life-style choices that may apply to you. You must talk with your health care provider for complete information about your health and treatment options. This information should not be used to decide whether or not to accept your health care providers advice, instructions or recommendations. Only your health care provider has the knowledge and training to provide advice that is right for you.  Copyright   Copyright © 2021 [employer] and its affiliates and/or licensors. All rights reserved.

## 2022-01-30 NOTE — PROGRESS NOTES
"Subjective:       Patient ID: Nayan Rivera is a 56 y.o. female.    Vitals:  temperature is 98.7 °F (37.1 °C). Her blood pressure is 164/82 (abnormal) and her pulse is 72. Her respiration is 15 and oxygen saturation is 98%.     Chief Complaint: Dental Pain    Pt presents today with a right sided tooth/ jaw pain.pt mentions her symptoms started 2 days ago and have gradually gotten worse.  No fever. Pt mentions no chewing or touching of teeth. Pt mentions taking tylenol to alleviate symptoms with no releif.  States she's had a cracked tooth for "a while" and now with swelling and significant tenderness.     Dental Pain   This is a new problem. The current episode started in the past 7 days. The problem occurs constantly. The problem has been gradually worsening. The pain is at a severity of 6/10. The pain is moderate. Associated symptoms include sinus pressure. Pertinent negatives include no difficulty swallowing, facial pain, fever, oral bleeding or thermal sensitivity. She has tried acetaminophen for the symptoms. The treatment provided no relief.       Constitution: Negative for fever.   HENT: Positive for dental problem, facial swelling and sinus pressure.    Musculoskeletal: Positive for pain.       Objective:      Physical Exam   Constitutional: She is oriented to person, place, and time. She appears well-developed and well-nourished. She is cooperative.  Non-toxic appearance. She does not have a sickly appearance. She does not appear ill. No distress.      Comments:Patient is well appearing, awake, alert, and in no acute distress.  VSS     HENT:   Head: Normocephalic and atraumatic.   Ears:   Right Ear: Hearing, external ear and ear canal normal. A middle ear effusion is present.   Left Ear: Hearing, tympanic membrane, external ear and ear canal normal.   Nose: Nose normal. No mucosal edema, rhinorrhea or nasal deformity. No epistaxis. Right sinus exhibits no maxillary sinus tenderness and no frontal sinus " tenderness. Left sinus exhibits no maxillary sinus tenderness and no frontal sinus tenderness.   Mouth/Throat: Uvula is midline, oropharynx is clear and moist and mucous membranes are normal. No trismus in the jaw. Dental abscesses and dental caries present. No uvula swelling. No oropharyngeal exudate, posterior oropharyngeal edema or posterior oropharyngeal erythema.       Eyes: Conjunctivae and lids are normal. No scleral icterus.   Neck: Trachea normal and phonation normal. Neck supple. No edema present. No erythema present. No neck rigidity present.   Cardiovascular: Normal rate, regular rhythm, normal heart sounds, intact distal pulses and normal pulses.   Pulmonary/Chest: Effort normal and breath sounds normal. No respiratory distress. She has no decreased breath sounds. She has no wheezes. She has no rhonchi. She has no rales.   Abdominal: Normal appearance.   Musculoskeletal: Normal range of motion.         General: No deformity or edema. Normal range of motion.   Neurological: She is alert and oriented to person, place, and time. She exhibits normal muscle tone. Coordination normal.   Skin: Skin is warm, dry, intact, not diaphoretic and not pale.   Psychiatric: She has a normal mood and affect. Her speech is normal and behavior is normal. Judgment and thought content normal. Cognition and memory  Nursing note and vitals reviewed.        Assessment:       1. Dental infection    2. Pain, dental          Plan:         Dental infection  -     amoxicillin-clavulanate 875-125mg (AUGMENTIN) 875-125 mg per tablet; Take 1 tablet by mouth every 12 (twelve) hours. for 7 days  Dispense: 14 tablet; Refill: 0    Pain, dental  -     naproxen (NAPROSYN) 500 MG tablet; Take 1 tablet (500 mg total) by mouth every 12 (twelve) hours as needed (pain).  Dispense: 20 tablet; Refill: 0    Will treat for apparent dental infection, patient has follow up with dentist on Friday.  Discussed home care and strict follow up precautions  for worsening symptoms.     Patient Instructions   Please follow up with your dentist within 7 days.    If your condition worsens or fails to improve we recommend that you receive another evaluation at the emergency room immediately or contact your primary medical clinic to discuss your concerns.     You must understand that you have received an Urgent Care treatment only and that you may be released before all of your medical problems are known or treated. You, the patient, will arrange for follow up care as instructed.     You received a prescription for a powerful NSAID today (Naproxen).  Please do not take another NSAID (ie aspirin, ibuprofen, Aleve, Advil or Motrin) while on this medication.  Take with meals if possible to avoid any GI problems.  You may take OTC Prilosec or Nexium to help reduce the chances of GI side effects.  If you continue to have pain, you may take Tylenol (acetaminophen) if you are not allergic to this medication.      Patient Education       Tooth Abscess ED   General Information   You came to the Emergency Department (ED) for a tooth abscess. An abscess is a collection of pus from an infection. You may have had an injury to a tooth, unhealthy gums, or tooth decay that led to your abscess. You may need a root canal or to have your tooth pulled because of an abscess. You will need antibiotics to treat the infection. It is important to take all of your antibiotics even if you start to feel better.  What care is needed at home?   · Call your regular dentist to let them know you were in the ED. Make a follow-up appointment with a dentist if you were told to.  · Avoid very cold or very hot food and drinks. These can make pain worse.  · If you smoke, try to quit. Your doctor or nurse can help.  · You may want to take medicines like ibuprofen, naproxen, or acetaminophen to help with pain.  · Brush your teeth at least 2 times a day. Use toothpaste with fluoride.  · Use dental floss to clean  between your teeth every day.  When do I need to get emergency help?   · Return to the ED if:   ? You have a fever of 100.4°F (38°C) or higher or chills.  ? You have swelling or pain in your neck or throat.  ? You are not able to open your mouth or eat.  ? You have trouble breathing.  When do I need to call the doctor?   · You have very bad pain that is not helped by pain medicines.  · You have swelling in your gums or face.  · You have discharge around the tooth.  · You have a bad taste in your mouth.  · You have lots of bleeding from the gums.  · You have more pain after having a tooth pulled.  · You have new or worsening symptoms.  Last Reviewed Date   2021-04-26  Consumer Information Use and Disclaimer   This information is not specific medical advice and does not replace information you receive from your health care provider. This is only a brief summary of general information. It does NOT include all information about conditions, illnesses, injuries, tests, procedures, treatments, therapies, discharge instructions or life-style choices that may apply to you. You must talk with your health care provider for complete information about your health and treatment options. This information should not be used to decide whether or not to accept your health care providers advice, instructions or recommendations. Only your health care provider has the knowledge and training to provide advice that is right for you.  Copyright   Copyright © 2021 [employer] and its affiliates and/or licensors. All rights reserved.

## 2023-09-10 ENCOUNTER — HOSPITAL ENCOUNTER (OUTPATIENT)
Facility: HOSPITAL | Age: 58
Discharge: HOME | End: 2023-09-12
Attending: EMERGENCY MEDICINE | Admitting: EMERGENCY MEDICINE
Payer: COMMERCIAL

## 2023-09-10 DIAGNOSIS — R11.10 VOMITING: ICD-10-CM

## 2023-09-10 DIAGNOSIS — R07.9 CHEST PAIN: ICD-10-CM

## 2023-09-10 DIAGNOSIS — R19.7 NAUSEA VOMITING AND DIARRHEA: Primary | ICD-10-CM

## 2023-09-10 DIAGNOSIS — E86.0 DEHYDRATION: ICD-10-CM

## 2023-09-10 DIAGNOSIS — M54.9 BACK PAIN: ICD-10-CM

## 2023-09-10 DIAGNOSIS — R11.2 NAUSEA VOMITING AND DIARRHEA: Primary | ICD-10-CM

## 2023-09-10 LAB
ALBUMIN SERPL BCP-MCNC: 2 G/DL (ref 3.5–5.2)
ALP SERPL-CCNC: 35 U/L (ref 55–135)
ALT SERPL W/O P-5'-P-CCNC: 10 U/L (ref 10–44)
ANION GAP SERPL CALC-SCNC: 14 MMOL/L (ref 8–16)
AST SERPL-CCNC: 11 U/L (ref 10–40)
B-HCG UR QL: NEGATIVE
BASOPHILS # BLD AUTO: 0.02 K/UL (ref 0–0.2)
BASOPHILS NFR BLD: 0.3 % (ref 0–1.9)
BILIRUB SERPL-MCNC: 0.3 MG/DL (ref 0.1–1)
BILIRUB UR QL STRIP: NEGATIVE
BUN SERPL-MCNC: 7 MG/DL (ref 6–20)
CALCIUM SERPL-MCNC: 6.5 MG/DL (ref 8.7–10.5)
CHLORIDE SERPL-SCNC: 116 MMOL/L (ref 95–110)
CLARITY UR REFRACT.AUTO: ABNORMAL
CO2 SERPL-SCNC: 9 MMOL/L (ref 23–29)
COLOR UR AUTO: YELLOW
CREAT SERPL-MCNC: 0.4 MG/DL (ref 0.5–1.4)
CTP QC/QA: YES
DIFFERENTIAL METHOD: ABNORMAL
EOSINOPHIL # BLD AUTO: 0 K/UL (ref 0–0.5)
EOSINOPHIL NFR BLD: 0.6 % (ref 0–8)
ERYTHROCYTE [DISTWIDTH] IN BLOOD BY AUTOMATED COUNT: 13 % (ref 11.5–14.5)
EST. GFR  (NO RACE VARIABLE): >60 ML/MIN/1.73 M^2
GLUCOSE SERPL-MCNC: 78 MG/DL (ref 70–110)
GLUCOSE UR QL STRIP: NEGATIVE
HCT VFR BLD AUTO: 36.9 % (ref 37–48.5)
HGB BLD-MCNC: 12.3 G/DL (ref 12–16)
HGB UR QL STRIP: ABNORMAL
IMM GRANULOCYTES # BLD AUTO: 0.02 K/UL (ref 0–0.04)
IMM GRANULOCYTES NFR BLD AUTO: 0.3 % (ref 0–0.5)
INFLUENZA A, MOLECULAR: NOT DETECTED
INFLUENZA B, MOLECULAR: NOT DETECTED
KETONES UR QL STRIP: ABNORMAL
LEUKOCYTE ESTERASE UR QL STRIP: NEGATIVE
LIPASE SERPL-CCNC: 11 U/L (ref 4–60)
LYMPHOCYTES # BLD AUTO: 0.4 K/UL (ref 1–4.8)
LYMPHOCYTES NFR BLD: 5.2 % (ref 18–48)
MCH RBC QN AUTO: 29.3 PG (ref 27–31)
MCHC RBC AUTO-ENTMCNC: 33.3 G/DL (ref 32–36)
MCV RBC AUTO: 88 FL (ref 82–98)
MONOCYTES # BLD AUTO: 0.4 K/UL (ref 0.3–1)
MONOCYTES NFR BLD: 5.6 % (ref 4–15)
NEUTROPHILS # BLD AUTO: 6.1 K/UL (ref 1.8–7.7)
NEUTROPHILS NFR BLD: 88 % (ref 38–73)
NITRITE UR QL STRIP: NEGATIVE
NRBC BLD-RTO: 0 /100 WBC
PH UR STRIP: 5 [PH] (ref 5–8)
PLATELET # BLD AUTO: 211 K/UL (ref 150–450)
PMV BLD AUTO: 10.4 FL (ref 9.2–12.9)
POCT GLUCOSE: 110 MG/DL (ref 70–110)
POTASSIUM SERPL-SCNC: 3.2 MMOL/L (ref 3.5–5.1)
PROT SERPL-MCNC: 3.8 G/DL (ref 6–8.4)
PROT UR QL STRIP: ABNORMAL
RBC # BLD AUTO: 4.2 M/UL (ref 4–5.4)
RSV AG BY MOLECULAR METHOD: NOT DETECTED
SARS-COV-2 RNA RESP QL NAA+PROBE: NOT DETECTED
SODIUM SERPL-SCNC: 139 MMOL/L (ref 136–145)
SP GR UR STRIP: >1.03 (ref 1–1.03)
URN SPEC COLLECT METH UR: ABNORMAL
WBC # BLD AUTO: 6.91 K/UL (ref 3.9–12.7)

## 2023-09-10 PROCEDURE — 82962 GLUCOSE BLOOD TEST: CPT

## 2023-09-10 PROCEDURE — 96365 THER/PROPH/DIAG IV INF INIT: CPT

## 2023-09-10 PROCEDURE — 25000003 PHARM REV CODE 250: Performed by: EMERGENCY MEDICINE

## 2023-09-10 PROCEDURE — 85025 COMPLETE CBC W/AUTO DIFF WBC: CPT | Performed by: EMERGENCY MEDICINE

## 2023-09-10 PROCEDURE — 96361 HYDRATE IV INFUSION ADD-ON: CPT

## 2023-09-10 PROCEDURE — 93010 ELECTROCARDIOGRAM REPORT: CPT | Mod: ,,, | Performed by: INTERNAL MEDICINE

## 2023-09-10 PROCEDURE — 96375 TX/PRO/DX INJ NEW DRUG ADDON: CPT

## 2023-09-10 PROCEDURE — 0241U SARS-COV2 (COVID) WITH FLU/RSV BY PCR: CPT | Performed by: FAMILY MEDICINE

## 2023-09-10 PROCEDURE — 81025 URINE PREGNANCY TEST: CPT | Performed by: EMERGENCY MEDICINE

## 2023-09-10 PROCEDURE — 63600175 PHARM REV CODE 636 W HCPCS: Performed by: EMERGENCY MEDICINE

## 2023-09-10 PROCEDURE — 83690 ASSAY OF LIPASE: CPT | Performed by: EMERGENCY MEDICINE

## 2023-09-10 PROCEDURE — 81003 URINALYSIS AUTO W/O SCOPE: CPT | Performed by: EMERGENCY MEDICINE

## 2023-09-10 PROCEDURE — 80053 COMPREHEN METABOLIC PANEL: CPT | Performed by: EMERGENCY MEDICINE

## 2023-09-10 PROCEDURE — 93005 ELECTROCARDIOGRAM TRACING: CPT

## 2023-09-10 PROCEDURE — 93010 EKG 12-LEAD: ICD-10-PCS | Mod: ,,, | Performed by: INTERNAL MEDICINE

## 2023-09-10 PROCEDURE — 96367 TX/PROPH/DG ADDL SEQ IV INF: CPT

## 2023-09-10 PROCEDURE — 99285 EMERGENCY DEPT VISIT HI MDM: CPT | Mod: 25

## 2023-09-10 PROCEDURE — G0378 HOSPITAL OBSERVATION PER HR: HCPCS

## 2023-09-10 RX ORDER — ACETAMINOPHEN 500 MG
1000 TABLET ORAL EVERY 8 HOURS PRN
Status: DISCONTINUED | OUTPATIENT
Start: 2023-09-11 | End: 2023-09-12 | Stop reason: HOSPADM

## 2023-09-10 RX ORDER — DIPHENHYDRAMINE HYDROCHLORIDE 50 MG/ML
12.5 INJECTION INTRAMUSCULAR; INTRAVENOUS
Status: COMPLETED | OUTPATIENT
Start: 2023-09-10 | End: 2023-09-10

## 2023-09-10 RX ORDER — IBUPROFEN 200 MG
16 TABLET ORAL
Status: DISCONTINUED | OUTPATIENT
Start: 2023-09-11 | End: 2023-09-12 | Stop reason: HOSPADM

## 2023-09-10 RX ORDER — TALC
6 POWDER (GRAM) TOPICAL NIGHTLY PRN
Status: DISCONTINUED | OUTPATIENT
Start: 2023-09-10 | End: 2023-09-10

## 2023-09-10 RX ORDER — LOPERAMIDE HYDROCHLORIDE 2 MG/1
4 CAPSULE ORAL
Status: COMPLETED | OUTPATIENT
Start: 2023-09-10 | End: 2023-09-10

## 2023-09-10 RX ORDER — SODIUM CHLORIDE 9 MG/ML
INJECTION, SOLUTION INTRAVENOUS CONTINUOUS
Status: DISCONTINUED | OUTPATIENT
Start: 2023-09-11 | End: 2023-09-11

## 2023-09-10 RX ORDER — ONDANSETRON 2 MG/ML
4 INJECTION INTRAMUSCULAR; INTRAVENOUS
Status: DISCONTINUED | OUTPATIENT
Start: 2023-09-10 | End: 2023-09-10

## 2023-09-10 RX ORDER — PROCHLORPERAZINE EDISYLATE 5 MG/ML
5 INJECTION INTRAMUSCULAR; INTRAVENOUS EVERY 6 HOURS PRN
Status: DISCONTINUED | OUTPATIENT
Start: 2023-09-11 | End: 2023-09-12 | Stop reason: HOSPADM

## 2023-09-10 RX ORDER — SODIUM CHLORIDE 0.9 % (FLUSH) 0.9 %
10 SYRINGE (ML) INJECTION
Status: DISCONTINUED | OUTPATIENT
Start: 2023-09-10 | End: 2023-09-12 | Stop reason: HOSPADM

## 2023-09-10 RX ORDER — ONDANSETRON 2 MG/ML
4 INJECTION INTRAMUSCULAR; INTRAVENOUS
Status: DISCONTINUED | OUTPATIENT
Start: 2023-09-10 | End: 2023-09-11

## 2023-09-10 RX ORDER — GLUCAGON 1 MG
1 KIT INJECTION
Status: DISCONTINUED | OUTPATIENT
Start: 2023-09-11 | End: 2023-09-12 | Stop reason: HOSPADM

## 2023-09-10 RX ORDER — TALC
6 POWDER (GRAM) TOPICAL NIGHTLY PRN
Status: DISCONTINUED | OUTPATIENT
Start: 2023-09-11 | End: 2023-09-12 | Stop reason: HOSPADM

## 2023-09-10 RX ORDER — MAG HYDROX/ALUMINUM HYD/SIMETH 200-200-20
30 SUSPENSION, ORAL (FINAL DOSE FORM) ORAL 4 TIMES DAILY PRN
Status: DISCONTINUED | OUTPATIENT
Start: 2023-09-11 | End: 2023-09-12 | Stop reason: HOSPADM

## 2023-09-10 RX ORDER — ONDANSETRON 2 MG/ML
4 INJECTION INTRAMUSCULAR; INTRAVENOUS EVERY 8 HOURS PRN
Status: DISCONTINUED | OUTPATIENT
Start: 2023-09-11 | End: 2023-09-11

## 2023-09-10 RX ORDER — METOCLOPRAMIDE HYDROCHLORIDE 5 MG/ML
10 INJECTION INTRAMUSCULAR; INTRAVENOUS
Status: COMPLETED | OUTPATIENT
Start: 2023-09-10 | End: 2023-09-10

## 2023-09-10 RX ORDER — OXYCODONE HYDROCHLORIDE 5 MG/1
5 TABLET ORAL EVERY 6 HOURS PRN
Status: DISCONTINUED | OUTPATIENT
Start: 2023-09-11 | End: 2023-09-12 | Stop reason: HOSPADM

## 2023-09-10 RX ORDER — IBUPROFEN 200 MG
24 TABLET ORAL
Status: DISCONTINUED | OUTPATIENT
Start: 2023-09-11 | End: 2023-09-12 | Stop reason: HOSPADM

## 2023-09-10 RX ORDER — CALCIUM GLUCONATE 20 MG/ML
1 INJECTION, SOLUTION INTRAVENOUS ONCE
Status: COMPLETED | OUTPATIENT
Start: 2023-09-10 | End: 2023-09-10

## 2023-09-10 RX ORDER — SODIUM CHLORIDE 0.9 % (FLUSH) 0.9 %
10 SYRINGE (ML) INJECTION EVERY 12 HOURS PRN
Status: DISCONTINUED | OUTPATIENT
Start: 2023-09-11 | End: 2023-09-12 | Stop reason: HOSPADM

## 2023-09-10 RX ORDER — NALOXONE HCL 0.4 MG/ML
0.02 VIAL (ML) INJECTION
Status: DISCONTINUED | OUTPATIENT
Start: 2023-09-11 | End: 2023-09-12 | Stop reason: HOSPADM

## 2023-09-10 RX ADMIN — SODIUM CHLORIDE, SODIUM LACTATE, POTASSIUM CHLORIDE, AND CALCIUM CHLORIDE 1000 ML: .6; .31; .03; .02 INJECTION, SOLUTION INTRAVENOUS at 06:09

## 2023-09-10 RX ADMIN — SODIUM CHLORIDE, SODIUM LACTATE, POTASSIUM CHLORIDE, AND CALCIUM CHLORIDE 1000 ML: .6; .31; .03; .02 INJECTION, SOLUTION INTRAVENOUS at 09:09

## 2023-09-10 RX ADMIN — DIPHENHYDRAMINE HYDROCHLORIDE 12.5 MG: 50 INJECTION, SOLUTION INTRAMUSCULAR; INTRAVENOUS at 10:09

## 2023-09-10 RX ADMIN — LOPERAMIDE HYDROCHLORIDE 4 MG: 2 CAPSULE ORAL at 07:09

## 2023-09-10 RX ADMIN — PROMETHAZINE HYDROCHLORIDE 25 MG: 25 INJECTION INTRAMUSCULAR; INTRAVENOUS at 07:09

## 2023-09-10 RX ADMIN — CALCIUM GLUCONATE 1 G: 20 INJECTION, SOLUTION INTRAVENOUS at 10:09

## 2023-09-10 RX ADMIN — METOCLOPRAMIDE 10 MG: 5 INJECTION, SOLUTION INTRAMUSCULAR; INTRAVENOUS at 10:09

## 2023-09-10 NOTE — ED NOTES
Nayan Rivera, a 58 y.o. female presents to the ED via EMS from home w/ complaint of  nausea/vomiting/weakness x1 day. AAOx4 GCS15. Daughter at bedside. Pt had bowel incontinence episode x1 in bed. States zofran given in route via EMS and states the Zofran did not help.    Triage note:  Chief Complaint   Patient presents with    Emesis     N/V/D started today. Denies c/p, SOB, fevers and chills.     Review of patient's allergies indicates:   Allergen Reactions    Lactose intolerance [lactase]      No past medical history on file.

## 2023-09-11 PROBLEM — R11.2 NAUSEA VOMITING AND DIARRHEA: Status: ACTIVE | Noted: 2023-09-11

## 2023-09-11 PROBLEM — R19.7 NAUSEA VOMITING AND DIARRHEA: Status: ACTIVE | Noted: 2023-09-11

## 2023-09-11 PROBLEM — E87.20 NORMAL ANION GAP METABOLIC ACIDOSIS: Status: ACTIVE | Noted: 2023-09-11

## 2023-09-11 LAB
ALBUMIN SERPL BCP-MCNC: 3.2 G/DL (ref 3.5–5.2)
ALP SERPL-CCNC: 60 U/L (ref 55–135)
ALT SERPL W/O P-5'-P-CCNC: 18 U/L (ref 10–44)
ANION GAP SERPL CALC-SCNC: 12 MMOL/L (ref 8–16)
AST SERPL-CCNC: 22 U/L (ref 10–40)
BASOPHILS # BLD AUTO: 0.02 K/UL (ref 0–0.2)
BASOPHILS NFR BLD: 0.4 % (ref 0–1.9)
BILIRUB SERPL-MCNC: 0.4 MG/DL (ref 0.1–1)
BUN SERPL-MCNC: 8 MG/DL (ref 6–20)
CALCIUM SERPL-MCNC: 9.1 MG/DL (ref 8.7–10.5)
CHLORIDE SERPL-SCNC: 106 MMOL/L (ref 95–110)
CO2 SERPL-SCNC: 22 MMOL/L (ref 23–29)
CREAT SERPL-MCNC: 0.7 MG/DL (ref 0.5–1.4)
DIFFERENTIAL METHOD: ABNORMAL
EOSINOPHIL # BLD AUTO: 0 K/UL (ref 0–0.5)
EOSINOPHIL NFR BLD: 0 % (ref 0–8)
ERYTHROCYTE [DISTWIDTH] IN BLOOD BY AUTOMATED COUNT: 13.2 % (ref 11.5–14.5)
EST. GFR  (NO RACE VARIABLE): >60 ML/MIN/1.73 M^2
GLUCOSE SERPL-MCNC: 119 MG/DL (ref 70–110)
HCT VFR BLD AUTO: 41.1 % (ref 37–48.5)
HGB BLD-MCNC: 13.2 G/DL (ref 12–16)
IMM GRANULOCYTES # BLD AUTO: 0.01 K/UL (ref 0–0.04)
IMM GRANULOCYTES NFR BLD AUTO: 0.2 % (ref 0–0.5)
LYMPHOCYTES # BLD AUTO: 0.4 K/UL (ref 1–4.8)
LYMPHOCYTES NFR BLD: 7.9 % (ref 18–48)
MAGNESIUM SERPL-MCNC: 1.7 MG/DL (ref 1.6–2.6)
MCH RBC QN AUTO: 28.7 PG (ref 27–31)
MCHC RBC AUTO-ENTMCNC: 32.1 G/DL (ref 32–36)
MCV RBC AUTO: 89 FL (ref 82–98)
MONOCYTES # BLD AUTO: 0.3 K/UL (ref 0.3–1)
MONOCYTES NFR BLD: 6.4 % (ref 4–15)
NEUTROPHILS # BLD AUTO: 4.1 K/UL (ref 1.8–7.7)
NEUTROPHILS NFR BLD: 85.1 % (ref 38–73)
NRBC BLD-RTO: 0 /100 WBC
PHOSPHATE SERPL-MCNC: 3 MG/DL (ref 2.7–4.5)
PLATELET # BLD AUTO: 267 K/UL (ref 150–450)
PMV BLD AUTO: 10.1 FL (ref 9.2–12.9)
POTASSIUM SERPL-SCNC: 3.7 MMOL/L (ref 3.5–5.1)
PROT SERPL-MCNC: 6.9 G/DL (ref 6–8.4)
RBC # BLD AUTO: 4.6 M/UL (ref 4–5.4)
SODIUM SERPL-SCNC: 140 MMOL/L (ref 136–145)
TSH SERPL DL<=0.005 MIU/L-ACNC: 2.06 UIU/ML (ref 0.4–4)
WBC # BLD AUTO: 4.84 K/UL (ref 3.9–12.7)

## 2023-09-11 PROCEDURE — 25000003 PHARM REV CODE 250: Performed by: FAMILY MEDICINE

## 2023-09-11 PROCEDURE — 84443 ASSAY THYROID STIM HORMONE: CPT | Performed by: FAMILY MEDICINE

## 2023-09-11 PROCEDURE — 99223 PR INITIAL HOSPITAL CARE,LEVL III: ICD-10-PCS | Mod: ,,, | Performed by: FAMILY MEDICINE

## 2023-09-11 PROCEDURE — 25000003 PHARM REV CODE 250

## 2023-09-11 PROCEDURE — 83993 ASSAY FOR CALPROTECTIN FECAL: CPT

## 2023-09-11 PROCEDURE — 99223 1ST HOSP IP/OBS HIGH 75: CPT | Mod: ,,, | Performed by: FAMILY MEDICINE

## 2023-09-11 PROCEDURE — 85025 COMPLETE CBC W/AUTO DIFF WBC: CPT | Performed by: FAMILY MEDICINE

## 2023-09-11 PROCEDURE — 87046 STOOL CULTR AEROBIC BACT EA: CPT

## 2023-09-11 PROCEDURE — 87209 SMEAR COMPLEX STAIN: CPT

## 2023-09-11 PROCEDURE — 36415 COLL VENOUS BLD VENIPUNCTURE: CPT | Performed by: FAMILY MEDICINE

## 2023-09-11 PROCEDURE — 87449 NOS EACH ORGANISM AG IA: CPT | Mod: 91

## 2023-09-11 PROCEDURE — 80053 COMPREHEN METABOLIC PANEL: CPT | Performed by: FAMILY MEDICINE

## 2023-09-11 PROCEDURE — 87338 HPYLORI STOOL AG IA: CPT

## 2023-09-11 PROCEDURE — 87449 NOS EACH ORGANISM AG IA: CPT

## 2023-09-11 PROCEDURE — G0378 HOSPITAL OBSERVATION PER HR: HCPCS

## 2023-09-11 PROCEDURE — 87329 GIARDIA AG IA: CPT

## 2023-09-11 PROCEDURE — 84100 ASSAY OF PHOSPHORUS: CPT | Performed by: FAMILY MEDICINE

## 2023-09-11 PROCEDURE — 63600175 PHARM REV CODE 636 W HCPCS

## 2023-09-11 PROCEDURE — 83735 ASSAY OF MAGNESIUM: CPT | Performed by: FAMILY MEDICINE

## 2023-09-11 PROCEDURE — 87045 FECES CULTURE AEROBIC BACT: CPT

## 2023-09-11 PROCEDURE — 82272 OCCULT BLD FECES 1-3 TESTS: CPT

## 2023-09-11 PROCEDURE — 96361 HYDRATE IV INFUSION ADD-ON: CPT

## 2023-09-11 PROCEDURE — 89055 LEUKOCYTE ASSESSMENT FECAL: CPT

## 2023-09-11 PROCEDURE — 96365 THER/PROPH/DIAG IV INF INIT: CPT | Mod: 59

## 2023-09-11 PROCEDURE — 87427 SHIGA-LIKE TOXIN AG IA: CPT

## 2023-09-11 PROCEDURE — 82653 EL-1 FECAL QUANTITATIVE: CPT

## 2023-09-11 PROCEDURE — 82705 FATS/LIPIDS FECES QUAL: CPT

## 2023-09-11 PROCEDURE — 63600175 PHARM REV CODE 636 W HCPCS: Performed by: INTERNAL MEDICINE

## 2023-09-11 PROCEDURE — 96372 THER/PROPH/DIAG INJ SC/IM: CPT | Performed by: INTERNAL MEDICINE

## 2023-09-11 PROCEDURE — 87425 ROTAVIRUS AG IA: CPT

## 2023-09-11 PROCEDURE — 94761 N-INVAS EAR/PLS OXIMETRY MLT: CPT

## 2023-09-11 RX ORDER — MAGNESIUM SULFATE HEPTAHYDRATE 40 MG/ML
2 INJECTION, SOLUTION INTRAVENOUS ONCE
Status: COMPLETED | OUTPATIENT
Start: 2023-09-11 | End: 2023-09-11

## 2023-09-11 RX ORDER — SODIUM CHLORIDE, SODIUM LACTATE, POTASSIUM CHLORIDE, CALCIUM CHLORIDE 600; 310; 30; 20 MG/100ML; MG/100ML; MG/100ML; MG/100ML
INJECTION, SOLUTION INTRAVENOUS CONTINUOUS
Status: DISCONTINUED | OUTPATIENT
Start: 2023-09-11 | End: 2023-09-12 | Stop reason: HOSPADM

## 2023-09-11 RX ORDER — ENOXAPARIN SODIUM 100 MG/ML
40 INJECTION SUBCUTANEOUS EVERY 24 HOURS
Status: DISCONTINUED | OUTPATIENT
Start: 2023-09-11 | End: 2023-09-11

## 2023-09-11 RX ORDER — LORAZEPAM 2 MG/ML
1 INJECTION INTRAMUSCULAR ONCE AS NEEDED
Status: DISCONTINUED | OUTPATIENT
Start: 2023-09-11 | End: 2023-09-11

## 2023-09-11 RX ORDER — ONDANSETRON 4 MG/1
4 TABLET, FILM COATED ORAL EVERY 6 HOURS PRN
Status: DISCONTINUED | OUTPATIENT
Start: 2023-09-11 | End: 2023-09-12 | Stop reason: HOSPADM

## 2023-09-11 RX ORDER — ENOXAPARIN SODIUM 100 MG/ML
40 INJECTION SUBCUTANEOUS EVERY 12 HOURS
Status: DISCONTINUED | OUTPATIENT
Start: 2023-09-11 | End: 2023-09-12 | Stop reason: HOSPADM

## 2023-09-11 RX ORDER — SODIUM BICARBONATE 650 MG/1
650 TABLET ORAL ONCE
Status: COMPLETED | OUTPATIENT
Start: 2023-09-11 | End: 2023-09-11

## 2023-09-11 RX ADMIN — ENOXAPARIN SODIUM 40 MG: 40 INJECTION SUBCUTANEOUS at 09:09

## 2023-09-11 RX ADMIN — POTASSIUM BICARBONATE 30 MEQ: 391 TABLET, EFFERVESCENT ORAL at 10:09

## 2023-09-11 RX ADMIN — SODIUM CHLORIDE, POTASSIUM CHLORIDE, SODIUM LACTATE AND CALCIUM CHLORIDE: 600; 310; 30; 20 INJECTION, SOLUTION INTRAVENOUS at 10:09

## 2023-09-11 RX ADMIN — ACETAMINOPHEN 1000 MG: 500 TABLET ORAL at 03:09

## 2023-09-11 RX ADMIN — SODIUM BICARBONATE 650 MG TABLET 650 MG: at 01:09

## 2023-09-11 RX ADMIN — MAGNESIUM SULFATE HEPTAHYDRATE 2 G: 40 INJECTION, SOLUTION INTRAVENOUS at 10:09

## 2023-09-11 RX ADMIN — SODIUM CHLORIDE: 9 INJECTION, SOLUTION INTRAVENOUS at 01:09

## 2023-09-11 NOTE — SUBJECTIVE & OBJECTIVE
No past medical history on file.    No past surgical history on file.    Review of patient's allergies indicates:   Allergen Reactions    Lactose intolerance [lactase]        No current facility-administered medications on file prior to encounter.     No current outpatient medications on file prior to encounter.     Family History    None       Tobacco Use    Smoking status: Never    Smokeless tobacco: Never   Substance and Sexual Activity    Alcohol use: Yes     Comment: social    Drug use: No    Sexual activity: Not on file     Review of Systems   Constitutional:  Positive for appetite change. Negative for chills, fatigue and fever.   HENT:  Negative for sore throat and trouble swallowing.    Eyes:  Negative for photophobia and visual disturbance.   Respiratory:  Negative for cough, shortness of breath and wheezing.    Cardiovascular:  Negative for chest pain, palpitations and leg swelling.   Gastrointestinal:  Positive for diarrhea, nausea and vomiting. Negative for abdominal distention, abdominal pain and blood in stool.   Genitourinary:  Negative for dysuria and hematuria.   Musculoskeletal:  Negative for neck pain and neck stiffness.   Skin:  Negative for rash and wound.   Neurological:  Positive for weakness. Negative for seizures, syncope, light-headedness, numbness and headaches.   Psychiatric/Behavioral:  Negative for confusion and decreased concentration.      Objective:     Vital Signs (Most Recent):  Temp: 99.3 °F (37.4 °C) (09/10/23 2347)  Pulse: 84 (09/10/23 2347)  Resp: 17 (09/10/23 2347)  BP: 139/66 (09/10/23 2347)  SpO2: 96 % (09/10/23 2347) Vital Signs (24h Range):  Temp:  [98.1 °F (36.7 °C)-99.3 °F (37.4 °C)] 99.3 °F (37.4 °C)  Pulse:  [66-90] 84  Resp:  [16-18] 17  SpO2:  [94 %-99 %] 96 %  BP: (117-144)/(55-72) 139/66        There is no height or weight on file to calculate BMI.     Physical Exam  Constitutional:       General: She is not in acute distress.     Appearance: Normal appearance. She  is obese. She is ill-appearing and diaphoretic. She is not toxic-appearing.   HENT:      Head: Normocephalic and atraumatic.      Nose: Nose normal.   Eyes:      General: No scleral icterus.     Extraocular Movements: Extraocular movements intact.      Pupils: Pupils are equal, round, and reactive to light.   Cardiovascular:      Rate and Rhythm: Normal rate and regular rhythm.   Pulmonary:      Effort: Pulmonary effort is normal. No respiratory distress.      Breath sounds: No wheezing or rales.   Abdominal:      General: Abdomen is flat. There is no distension.      Palpations: Abdomen is soft.      Tenderness: There is no abdominal tenderness. There is no guarding.   Musculoskeletal:         General: Normal range of motion.      Cervical back: Normal range of motion and neck supple. No rigidity.      Right lower leg: No edema.      Left lower leg: No edema.   Skin:     General: Skin is warm.      Coloration: Skin is not jaundiced or pale.   Neurological:      General: No focal deficit present.      Mental Status: She is alert and oriented to person, place, and time.      Cranial Nerves: No cranial nerve deficit.   Psychiatric:         Mood and Affect: Mood normal.         Behavior: Behavior normal.              CRANIAL NERVES     CN III, IV, VI   Pupils are equal, round, and reactive to light.       Significant Labs: All pertinent labs within the past 24 hours have been reviewed.  CBC:   Recent Labs   Lab 09/10/23  1815   WBC 6.91   HGB 12.3   HCT 36.9*        CMP:   Recent Labs   Lab 09/10/23  2008      K 3.2*   *   CO2 9*   GLU 78   BUN 7   CREATININE 0.4*   CALCIUM 6.5*   PROT 3.8*   ALBUMIN 2.0*   BILITOT 0.3   ALKPHOS 35*   AST 11   ALT 10   ANIONGAP 14     Urine Studies:   Recent Labs   Lab 09/10/23  1956   COLORU Yellow   APPEARANCEUA Hazy*   PHUR 5.0   SPECGRAV >1.030*   PROTEINUA Trace*   GLUCUA Negative   KETONESU Trace*   BILIRUBINUA Negative   OCCULTUA Trace*   NITRITE Negative    LEUKOCYTESUR Negative       Significant Imaging: I have reviewed all pertinent imaging results/findings within the past 24 hours.

## 2023-09-11 NOTE — ASSESSMENT & PLAN NOTE
- suspect secondary to viral illness though other etiologies still considered  - IVFs  - follow up CT abdomen  - clear liquid diet  - antiemetics prn  - further management pending clinical course and future study review

## 2023-09-11 NOTE — HPI
58-year-old female with no significant past medical history presenting with 1 day of nausea, vomiting, and diarrhea.  Patient reports that she had multiple episodes of diarrhea starting today, and then began to have nonbloody vomiting.  She reports she ate sushi 3 days ago, no recent travel, no sick contacts.  Endorses lightheadedness and fatigue.  Denies abdominal pain, chest pain, cough, myalgias, bloody stool.    In the ED patient afebrile and hemodynamically stable saturating well on room air. Persistent nausea and diarrhea while in ED improving with antiemetics. No other sick family members at home including those who also ate the same sushi. K+ 3.2, corrected Ca 8.1, Cl 116, CO2 9. Patient started on aggressive IVFs and electrolyte repletion. Admitted to the care of medicine for further evaluation and management.

## 2023-09-11 NOTE — NURSING
Nurses Note -- 4 Eyes      9/11/2023   1:32 AM      Skin assessed during: Admit      [x] No Altered Skin Integrity Present    [x]Prevention Measures Documented      [] Yes- Altered Skin Integrity Present or Discovered   [] LDA Added if Not in Epic (Describe Wound)   [] New Altered Skin Integrity was Present on Admit and Documented in LDA   [] Wound Image Taken    Wound Care Consulted? No    Attending Nurse:  Jocelyn Maki RN/Staff Member:   CHUY Garcia

## 2023-09-11 NOTE — CARE UPDATE
Ms. Rivera was placed in observation for intractable nausea, vomiting, and diarrhea. Pt seen and examined by me this morning. VSSAF. ALVINA since admission. Pt reports improvement in nausea and vomiting with PRN antiemetics, last episode of emesis last night. She reports persistent non-bloody, watery diarrhea. Stool studies pending. She denies abdominal pain and abdominal exam is reassuring. CTAP cancelled due to patient preference. KUB ordered. Continue CLD and ADAT. Plan to discharge home with PCP follow-up pending clinical course.     Rica Hull, PA-C  [facility]

## 2023-09-11 NOTE — PLAN OF CARE
PT arrived to room 1156 by stretcher with daughter. VSS. On RA. Pt tolerating clear liquid diet. Pt denies pain or nausea. Pt up independent in room. Pt getting IVF @ 125. Daughter at bedside. No skin issues noted at admission. All questions answered. Bed in lowest setting. Call light within reach.

## 2023-09-11 NOTE — H&P
[facility] - Observation [address] [place] [address] Medicine  History & Physical    Patient Name: Nayan Rivera  MRN: [id number]  Patient Class: OP- Observation  Admission Date: 9/10/2023  Attending Physician: Kamran Vasquez MD   Primary Care Provider: [facility] Primary Doctor         Patient information was obtained from patient, past medical records and ER records.     Subjective:     Principal Problem:Nausea vomiting and diarrhea    Chief Complaint:   Chief Complaint   Patient presents with    Emesis     N/V/D started today. Denies c/p, SOB, fevers and chills.        HPI: 58-year-old female with no significant past medical history presenting with 1 day of nausea, vomiting, and diarrhea.  Patient reports that she had multiple episodes of diarrhea starting today, and then began to have nonbloody vomiting.  She reports she ate sushi 3 days ago, no recent travel, no sick contacts.  Endorses lightheadedness and fatigue.  Denies abdominal pain, chest pain, cough, myalgias, bloody stool.    In the ED patient afebrile and hemodynamically stable saturating well on room air. Persistent nausea and diarrhea while in ED improving with antiemetics. No other sick family members at home including those who also ate the same sushi. K+ 3.2, corrected Ca 8.1, Cl 116, CO2 9. Patient started on aggressive IVFs and electrolyte repletion. Admitted to the care of medicine for further evaluation and management.       No past medical history on file.    No past surgical history on file.    Review of patient's allergies indicates:   Allergen Reactions    Lactose intolerance [lactase]        No current facility-administered medications on file prior to encounter.     No current outpatient medications on file prior to encounter.     Family History    None       Tobacco Use    Smoking status: Never    Smokeless tobacco: Never   Substance and Sexual Activity    Alcohol use: Yes     Comment: social    Drug use: No    Sexual activity: Not on file     Review  of Systems   Constitutional:  Positive for appetite change. Negative for chills, fatigue and fever.   HENT:  Negative for sore throat and trouble swallowing.    Eyes:  Negative for photophobia and visual disturbance.   Respiratory:  Negative for cough, shortness of breath and wheezing.    Cardiovascular:  Negative for chest pain, palpitations and leg swelling.   Gastrointestinal:  Positive for diarrhea, nausea and vomiting. Negative for abdominal distention, abdominal pain and blood in stool.   Genitourinary:  Negative for dysuria and hematuria.   Musculoskeletal:  Negative for neck pain and neck stiffness.   Skin:  Negative for rash and wound.   Neurological:  Positive for weakness. Negative for seizures, syncope, light-headedness, numbness and headaches.   Psychiatric/Behavioral:  Negative for confusion and decreased concentration.      Objective:     Vital Signs (Most Recent):  Temp: 99.3 °F (37.4 °C) (09/10/23 2347)  Pulse: 84 (09/10/23 2347)  Resp: 17 (09/10/23 2347)  BP: 139/66 (09/10/23 2347)  SpO2: 96 % (09/10/23 2347) Vital Signs (24h Range):  Temp:  [98.1 °F (36.7 °C)-99.3 °F (37.4 °C)] 99.3 °F (37.4 °C)  Pulse:  [66-90] 84  Resp:  [16-18] 17  SpO2:  [94 %-99 %] 96 %  BP: (117-144)/(55-72) 139/66        There is no height or weight on file to calculate BMI.     Physical Exam  Constitutional:       General: She is not in acute distress.     Appearance: Normal appearance. She is obese. She is ill-appearing and diaphoretic. She is not toxic-appearing.   HENT:      Head: Normocephalic and atraumatic.      Nose: Nose normal.   Eyes:      General: No scleral icterus.     Extraocular Movements: Extraocular movements intact.      Pupils: Pupils are equal, round, and reactive to light.   Cardiovascular:      Rate and Rhythm: Normal rate and regular rhythm.   Pulmonary:      Effort: Pulmonary effort is normal. No respiratory distress.      Breath sounds: No wheezing or rales.   Abdominal:      General: Abdomen is  flat. There is no distension.      Palpations: Abdomen is soft.      Tenderness: There is no abdominal tenderness. There is no guarding.   Musculoskeletal:         General: Normal range of motion.      Cervical back: Normal range of motion and neck supple. No rigidity.      Right lower leg: No edema.      Left lower leg: No edema.   Skin:     General: Skin is warm.      Coloration: Skin is not jaundiced or pale.   Neurological:      General: No focal deficit present.      Mental Status: She is alert and oriented to person, place, and time.      Cranial Nerves: No cranial nerve deficit.   Psychiatric:         Mood and Affect: Mood normal.         Behavior: Behavior normal.              CRANIAL NERVES     CN III, IV, VI   Pupils are equal, round, and reactive to light.       Significant Labs: All pertinent labs within the past 24 hours have been reviewed.  CBC:   Recent Labs   Lab 09/10/23  1815   WBC 6.91   HGB 12.3   HCT 36.9*        CMP:   Recent Labs   Lab 09/10/23  2008      K 3.2*   *   CO2 9*   GLU 78   BUN 7   CREATININE 0.4*   CALCIUM 6.5*   PROT 3.8*   ALBUMIN 2.0*   BILITOT 0.3   ALKPHOS 35*   AST 11   ALT 10   ANIONGAP 14     Urine Studies:   Recent Labs   Lab 09/10/23  1956   COLORU Yellow   APPEARANCEUA Hazy*   PHUR 5.0   SPECGRAV >1.030*   PROTEINUA Trace*   GLUCUA Negative   KETONESU Trace*   BILIRUBINUA Negative   OCCULTUA Trace*   NITRITE Negative   LEUKOCYTESUR Negative       Significant Imaging: I have reviewed all pertinent imaging results/findings within the past 24 hours.    Assessment/Plan:     * Nausea vomiting and diarrhea  - suspect secondary to viral illness though other etiologies still considered  - IVFs  - follow up CT abdomen  - clear liquid diet  - antiemetics prn  - further management pending clinical course and future study review      Normal anion gap metabolic acidosis  - secondary to n/v/d  - further management as above        VTE Risk Mitigation (From  admission, onward)         Ordered     Place sequential compression device  Until discontinued         09/10/23 2148     IP VTE LOW RISK PATIENT  Once         09/10/23 2148                   On 09/11/2023, patient should be placed in hospital observation services under my care.        Kory Cortez MD  Department of Hospital Medicine  [facility] - Observation 11H

## 2023-09-11 NOTE — PROGRESS NOTES
Pharmacist Renal Dose Adjustment Note    Nayan Rivera is a 58 y.o. female being treated with the medication enoxaparin    Patient Data:    Vital Signs (Most Recent):  Temp: 98 °F (36.7 °C) (09/11/23 1216)  Pulse: 70 (09/11/23 1216)  Resp: 17 (09/11/23 1216)  BP: 135/63 (09/11/23 1216)  SpO2: 98 % (09/11/23 1216) Vital Signs (72h Range):  Temp:  [97.1 °F (36.2 °C)-99.3 °F (37.4 °C)]   Pulse:  [66-93]   Resp:  [16-18]   BP: (107-144)/(52-72)   SpO2:  [94 %-99 %]      Recent Labs   Lab 09/10/23  2008 09/11/23  0459   CREATININE 0.4* 0.7     Serum creatinine: 0.7 mg/dL 09/11/23 0459  Estimated creatinine clearance: 116.5 mL/min  BMI 49.56kg/m2    Enoxaparin 40 mg Q24H will be changed to Enoxaparin 40 mg Q12H   Pharmacist's Name: Jaz White  Pharmacist's Extension: [phone]

## 2023-09-11 NOTE — PLAN OF CARE
[facility] Observation 11H  Initial Discharge Assessment       Primary Care Provider: No, Primary Doctor    Admission Diagnosis: Dehydration [E86.0]  Back pain [M54.9]  Vomiting [R11.10]  Chest pain [R07.9]    Admission Date: 9/10/2023  Expected Discharge Date: 9/11/2023    Transition of Care Barriers: None    Payor: BLUE CROSS BLUE SHIELD / Plan: BLUE CONNECT / Product Type: HMO /     Extended Emergency Contact Information  Primary Emergency Contact: Jeanette Albrecht   [place]  Mobile Phone: [phone]  Relation: Daughter  Secondary Emergency Contact: Kady Rivera   [place]  Mobile Phone: [phone]  Relation: Sister    Discharge Plan A: Home with family         [id number] - [place], [place] - [address] AT [facility] [address]  [facility] [place] [place] [address]  Phone: [phone] Fax: [phone]      Initial Assessment (most recent)       Adult Discharge Assessment - 09/11/23 1130          Discharge Assessment    Assessment Type Discharge Planning Assessment     Confirmed/corrected address, phone number and insurance Yes     Confirmed Demographics Correct on Facesheet     Source of Information patient;family     If unable to respond/provide information was family/caregiver contacted? Yes     Communicated MARBIN with patient/caregiver Yes     People in Home child(jairo), adult;parent(s)     Do you expect to return to your current living situation? Yes     Do you have help at home or someone to help you manage your care at home? Yes     Prior to hospitilization cognitive status: Alert/Oriented     Current cognitive status: Alert/Oriented     Home Accessibility wheelchair accessible     Home Layout Able to live on 1st floor     Equipment Currently Used at Home none     Readmission within 30 days? No     Patient currently being followed by outpatient case management? No     Do you currently have service(s) that help you  manage your care at home? No     Do you take prescription medications? Yes     Do you have prescription coverage? Yes     Do you have any problems affording any of your prescribed medications? No     Is the patient taking medications as prescribed? yes     How do you get to doctors appointments? car, drives self;family or friend will provide     Are you on dialysis? No     Do you take coumadin? No     DME Needed Upon Discharge  none     Discharge Plan discussed with: Patient;Adult children     Transition of Care Barriers None     Discharge Plan A Home with family        Physical Activity    On average, how many days per week do you engage in moderate to strenuous exercise (like a brisk walk)? 0 days     On average, how many minutes do you engage in exercise at this level? 0 min        Financial Resource Strain    How hard is it for you to pay for the very basics like food, housing, medical care, and heating? Not hard at all        Housing Stability    In the last 12 months, was there a time when you were not able to pay the mortgage or rent on time? No     In the last 12 months, was there a time when you did not have a steady place to sleep or slept in a shelter (including now)? No        Transportation Needs    In the past 12 months, has lack of transportation kept you from medical appointments or from getting medications? No     In the past 12 months, has lack of transportation kept you from meetings, work, or from getting things needed for daily living? No        Food Insecurity    Within the past 12 months, you worried that your food would run out before you got the money to buy more. Never true     Within the past 12 months, the food you bought just didn't last and you didn't have money to get more. Never true                   DEE completed discharge planning assessment with the patient and pt's daughter (Jeanette) at bedside. DEE verified demographic information listed on the pt.'s Face sheet. Pt reports living in  a single story home with her parents and adult son. Pt plans for her daughter and other children to help manage her care upon discharge. Pt reports being independent prior to this admit.SW is following this Pt for DC planning needs. There are no identified needs at this time.      Sally Sierra, JAMES  Case Management   [facility]   Ext. [phone]

## 2023-09-11 NOTE — ED PROVIDER NOTES
Encounter Date: 9/10/2023       History     Chief Complaint   Patient presents with    Emesis     N/V/D started today. Denies c/p, SOB, fevers and chills.     58-year-old female with no significant past medical history presenting with 1 day of nausea, vomiting, and diarrhea.  Patient reports that she had multiple episodes of diarrhea starting today, and then began to have nonbloody vomiting.  She reports she ate sushi 3 days ago, no recent travel, no sick contacts.  Endorses lightheadedness and fatigue.  Denies abdominal pain, chest pain, cough, myalgias, bloody stool.      Review of patient's allergies indicates:   Allergen Reactions    Lactose intolerance [lactase]      No past medical history on file.  No past surgical history on file.  No family history on file.  Social History     Tobacco Use    Smoking status: Never    Smokeless tobacco: Never   Substance Use Topics    Alcohol use: Yes     Comment: social    Drug use: No     Review of Systems    Physical Exam     Initial Vitals [09/10/23 1646]   BP Pulse Resp Temp SpO2   128/72 84 17 98.1 °F (36.7 °C) 98 %      MAP       --         Physical Exam    Nursing note and vitals reviewed.  Constitutional: She appears well-developed and well-nourished. She is not diaphoretic. She appears distressed.   HENT:   Head: Normocephalic and atraumatic.   Nose: Nose normal.   Eyes: Conjunctivae and EOM are normal. Pupils are equal, round, and reactive to light.   Neck: Neck supple.   Normal range of motion.  Cardiovascular:  Normal rate and regular rhythm.           Pulmonary/Chest: Breath sounds normal. No respiratory distress. She has no wheezes. She has no rhonchi. She has no rales. She exhibits no tenderness.   Abdominal: Abdomen is soft. Bowel sounds are normal. She exhibits no distension. There is no abdominal tenderness.   Actively vomiting There is no rebound.   Musculoskeletal:         General: No edema. Normal range of motion.      Cervical back: Normal range of  motion and neck supple.     Neurological: She is alert and oriented to person, place, and time. She has normal strength.   Skin: Skin is warm and dry. Capillary refill takes less than 2 seconds. No rash noted. No pallor.   Psychiatric: She has a normal mood and affect. Her behavior is normal. Judgment and thought content normal.         ED Course   Procedures  Labs Reviewed   CBC W/ AUTO DIFFERENTIAL - Abnormal; Notable for the following components:       Result Value    Hematocrit 36.9 (*)     Lymph # 0.4 (*)     Gran % 88.0 (*)     Lymph % 5.2 (*)     All other components within normal limits   URINALYSIS, REFLEX TO URINE CULTURE - Abnormal; Notable for the following components:    Appearance, UA Hazy (*)     Specific Gravity, UA >1.030 (*)     Protein, UA Trace (*)     Ketones, UA Trace (*)     Occult Blood UA Trace (*)     All other components within normal limits    Narrative:     Specimen Source->Urine   COMPREHENSIVE METABOLIC PANEL - Abnormal; Notable for the following components:    Potassium 3.2 (*)     Chloride 116 (*)     CO2 9 (*)     Creatinine 0.4 (*)     Calcium 6.5 (*)     Total Protein 3.8 (*)     Albumin 2.0 (*)     Alkaline Phosphatase 35 (*)     All other components within normal limits   LIPASE   SARS-COV2 (COVID) WITH FLU/RSV BY PCR   POCT URINE PREGNANCY   POCT GLUCOSE        ECG Results              EKG 12-lead (Final result)  Result time 09/11/23 11:04:35      Final result by Interface, Lab In [facility] (09/11/23 11:04:35)                   Narrative:    Test Reason : M54.9,    Vent. Rate : 075 BPM     Atrial Rate : 075 BPM     P-R Int : 122 ms          QRS Dur : 084 ms      QT Int : 410 ms       P-R-T Axes : 038 -29 056 degrees     QTc Int : 457 ms    Normal sinus rhythm  Septal infarct ,age undetermined  Abnormal ECG  When compared with ECG of 12-OCT-1996 16:42,  The axis Shifted left  Nonspecific T wave abnormality now evident in Lateral leads  Confirmed by Angie Cavanaugh MD ([id number]) on  9/11/2023 11:03:23 AM    Referred By: TOLU   SELF           Confirmed By:Angie Cavanaugh MD                                  Imaging Results    None          Medications   promethazine (PHENERGAN) 25 mg in dextrose 5 % (D5W) 50 mL IVPB (0 mg Intravenous Stopped 9/10/23 1952)   lactated ringers bolus 1,000 mL (0 mLs Intravenous Stopped 9/10/23 2130)   loperamide capsule 4 mg (4 mg Oral Given 9/10/23 1920)   lactated ringers bolus 1,000 mL (0 mLs Intravenous Stopped 9/10/23 2342)   calcium gluconate 1 g in NS IVPB (premixed) (0 g Intravenous Stopped 9/10/23 2343)   metoclopramide HCl injection 10 mg (10 mg Intravenous Given 9/10/23 2220)   diphenhydrAMINE injection 12.5 mg (12.5 mg Intravenous Given 9/10/23 2220)   sodium bicarbonate tablet 650 mg (650 mg Oral Given 9/11/23 0130)   magnesium sulfate 2g in water 50mL IVPB (premix) (0 g Intravenous Stopped 9/11/23 1203)   potassium bicarbonate disintegrating tablet 30 mEq (30 mEq Oral Given 9/11/23 1049)   sodium chloride 0.9% 1,000 mL with mvi, (ADULT) no.4 with vit K 3,300 unit- 150 mcg/10 mL 10 mL, thiamine 100 mg, folic acid 1 mg infusion ( Intravenous New Bag 9/12/23 0604)   potassium bicarbonate disintegrating tablet 40 mEq (40 mEq Oral Given 9/12/23 1057)     Medical Decision Making  Pt is nontoxic appearing although witnessed to have multiple episodes of vomiting, likely viral gastroenteritis. No abd TTP concerning for surgical pathology, no recent travel and afebrile, low suspicion for dysentery. As pt is healthy and not immunocompromised no indication for antibiotics at this time, however she does appear fatigued and dehydrated with several electrolyte abnormalities, and will admit for further treatment.     Amount and/or Complexity of Data Reviewed  Labs: ordered. Decision-making details documented in ED Course.  ECG/medicine tests:  Decision-making details documented in ED Course.  Discussion of management or test interpretation with external  provider(s): I have discussed this patient with the hospitalist who will admit patient for further evaluation and management.      Risk  Prescription drug management.  Decision regarding hospitalization.               ED Course as of 09/15/23 1421   Sun Sep 10, 2023   1909 EKG 12-lead  Normal sinus rhythm rate 75, normal intervals without QT prolongation, no ischemic changes [JR]   1916 WBC: 6.91 [JR]   1916 Hemoglobin: 12.3 [JR]   1916 Gran # (ANC): 6.1 [JR]   2036 Ketones, UA(!): Trace  Getting IV fluids [JR]   2105 Albumin(!): 2.0 [JR]   2106 Calcium(!!): 6.5  8.1 when corrected for hypoalbuminemia, will replete [JR]   2106 CO2(!!): 9  Will admit for IV fluids [JR]   2106 Potassium(!): 3.2  Will replete [JR]      ED Course User Index  [JR] Yeni Dial MD                    Clinical Impression:   Final diagnoses:  [R11.10] Vomiting  [M54.9] Back pain  [E86.0] Dehydration        ED Disposition Condition    Observation Stable                Yeni Dial MD  09/15/23 1421

## 2023-09-11 NOTE — HOSPITAL COURSE
Ms. Rivera was placed in observation for intractable nausea, vomiting, and diarrhea. Pt seen and examined by me this morning. VSSAF. ALVINA since admission. Pt reports significant symptomatic improvement in nausea and vomiting with PRN antiemetics and IVFs. C. Diff negative. She denies abdominal pain and abdominal exam is reassuring.On my evaluation this morning, the patient reports feeling well and is eager to discharge home. All questions were answered. Patient acknowledged understanding of discharge instructions and feels safe to discharge home. Patient was discharged on 9/12/2023 in stable condition with PCP follow-up. Education regarding condition provided and return precautions given.

## 2023-09-12 VITALS
WEIGHT: 279.75 LBS | OXYGEN SATURATION: 96 % | TEMPERATURE: 98 F | HEIGHT: 63 IN | DIASTOLIC BLOOD PRESSURE: 68 MMHG | SYSTOLIC BLOOD PRESSURE: 137 MMHG | RESPIRATION RATE: 17 BRPM | HEART RATE: 52 BPM | BODY MASS INDEX: 49.57 KG/M2

## 2023-09-12 LAB
ALBUMIN SERPL BCP-MCNC: 2.9 G/DL (ref 3.5–5.2)
ALP SERPL-CCNC: 53 U/L (ref 55–135)
ALT SERPL W/O P-5'-P-CCNC: 19 U/L (ref 10–44)
ANION GAP SERPL CALC-SCNC: 11 MMOL/L (ref 8–16)
AST SERPL-CCNC: 28 U/L (ref 10–40)
BASOPHILS # BLD AUTO: 0.02 K/UL (ref 0–0.2)
BASOPHILS NFR BLD: 0.5 % (ref 0–1.9)
BILIRUB SERPL-MCNC: 0.3 MG/DL (ref 0.1–1)
BUN SERPL-MCNC: 6 MG/DL (ref 6–20)
C DIFF GDH STL QL: NEGATIVE
C DIFF TOX A+B STL QL IA: NEGATIVE
CALCIUM SERPL-MCNC: 9 MG/DL (ref 8.7–10.5)
CHLORIDE SERPL-SCNC: 106 MMOL/L (ref 95–110)
CO2 SERPL-SCNC: 22 MMOL/L (ref 23–29)
CREAT SERPL-MCNC: 0.6 MG/DL (ref 0.5–1.4)
CRYPTOSP AG STL QL IA: NEGATIVE
DIFFERENTIAL METHOD: NORMAL
EOSINOPHIL # BLD AUTO: 0.1 K/UL (ref 0–0.5)
EOSINOPHIL NFR BLD: 2.3 % (ref 0–8)
ERYTHROCYTE [DISTWIDTH] IN BLOOD BY AUTOMATED COUNT: 13.2 % (ref 11.5–14.5)
EST. GFR  (NO RACE VARIABLE): >60 ML/MIN/1.73 M^2
G LAMBLIA AG STL QL IA: NEGATIVE
GLUCOSE SERPL-MCNC: 86 MG/DL (ref 70–110)
HCT VFR BLD AUTO: 38.2 % (ref 37–48.5)
HGB BLD-MCNC: 12.6 G/DL (ref 12–16)
IMM GRANULOCYTES # BLD AUTO: 0.01 K/UL (ref 0–0.04)
IMM GRANULOCYTES NFR BLD AUTO: 0.3 % (ref 0–0.5)
LYMPHOCYTES # BLD AUTO: 1.3 K/UL (ref 1–4.8)
LYMPHOCYTES NFR BLD: 31.9 % (ref 18–48)
MAGNESIUM SERPL-MCNC: 1.9 MG/DL (ref 1.6–2.6)
MCH RBC QN AUTO: 29.6 PG (ref 27–31)
MCHC RBC AUTO-ENTMCNC: 33 G/DL (ref 32–36)
MCV RBC AUTO: 90 FL (ref 82–98)
MONOCYTES # BLD AUTO: 0.4 K/UL (ref 0.3–1)
MONOCYTES NFR BLD: 11.2 % (ref 4–15)
NEUTROPHILS # BLD AUTO: 2.1 K/UL (ref 1.8–7.7)
NEUTROPHILS NFR BLD: 53.8 % (ref 38–73)
NRBC BLD-RTO: 0 /100 WBC
OB PNL STL: POSITIVE
PHOSPHATE SERPL-MCNC: 2.3 MG/DL (ref 2.7–4.5)
PLATELET # BLD AUTO: 218 K/UL (ref 150–450)
PMV BLD AUTO: 10.1 FL (ref 9.2–12.9)
POTASSIUM SERPL-SCNC: 3.4 MMOL/L (ref 3.5–5.1)
PROT SERPL-MCNC: 6.3 G/DL (ref 6–8.4)
RBC # BLD AUTO: 4.25 M/UL (ref 4–5.4)
RV AG STL QL IA.RAPID: NEGATIVE
SODIUM SERPL-SCNC: 139 MMOL/L (ref 136–145)
WBC # BLD AUTO: 3.92 K/UL (ref 3.9–12.7)
WBC #/AREA STL HPF: NORMAL /[HPF]

## 2023-09-12 PROCEDURE — 99239 HOSP IP/OBS DSCHRG MGMT >30: CPT | Mod: ,,,

## 2023-09-12 PROCEDURE — 96375 TX/PRO/DX INJ NEW DRUG ADDON: CPT

## 2023-09-12 PROCEDURE — 84100 ASSAY OF PHOSPHORUS: CPT | Performed by: FAMILY MEDICINE

## 2023-09-12 PROCEDURE — 85025 COMPLETE CBC W/AUTO DIFF WBC: CPT | Performed by: FAMILY MEDICINE

## 2023-09-12 PROCEDURE — 96372 THER/PROPH/DIAG INJ SC/IM: CPT | Performed by: INTERNAL MEDICINE

## 2023-09-12 PROCEDURE — 80053 COMPREHEN METABOLIC PANEL: CPT | Performed by: FAMILY MEDICINE

## 2023-09-12 PROCEDURE — 63600175 PHARM REV CODE 636 W HCPCS

## 2023-09-12 PROCEDURE — G0378 HOSPITAL OBSERVATION PER HR: HCPCS

## 2023-09-12 PROCEDURE — 94761 N-INVAS EAR/PLS OXIMETRY MLT: CPT

## 2023-09-12 PROCEDURE — 99239 PR HOSPITAL DISCHARGE DAY,>30 MIN: ICD-10-PCS | Mod: ,,,

## 2023-09-12 PROCEDURE — 36415 COLL VENOUS BLD VENIPUNCTURE: CPT | Performed by: FAMILY MEDICINE

## 2023-09-12 PROCEDURE — 83735 ASSAY OF MAGNESIUM: CPT | Performed by: FAMILY MEDICINE

## 2023-09-12 PROCEDURE — 25000003 PHARM REV CODE 250

## 2023-09-12 PROCEDURE — 63600175 PHARM REV CODE 636 W HCPCS: Performed by: INTERNAL MEDICINE

## 2023-09-12 RX ORDER — SODIUM,POTASSIUM PHOSPHATES 280-250MG
1 POWDER IN PACKET (EA) ORAL EVERY 4 HOURS
Status: DISCONTINUED | OUTPATIENT
Start: 2023-09-12 | End: 2023-09-12 | Stop reason: HOSPADM

## 2023-09-12 RX ORDER — ONDANSETRON 4 MG/1
4-8 TABLET, FILM COATED ORAL EVERY 6 HOURS PRN
Qty: 40 TABLET | Refills: 0 | Status: SHIPPED | OUTPATIENT
Start: 2023-09-12 | End: 2023-09-22

## 2023-09-12 RX ORDER — OMEPRAZOLE 40 MG/1
40 CAPSULE, DELAYED RELEASE ORAL 2 TIMES DAILY PRN
Qty: 30 CAPSULE | Refills: 0 | Status: SHIPPED | OUTPATIENT
Start: 2023-09-12

## 2023-09-12 RX ADMIN — POTASSIUM & SODIUM PHOSPHATES POWDER PACK 280-160-250 MG 1 PACKET: 280-160-250 PACK at 10:09

## 2023-09-12 RX ADMIN — ENOXAPARIN SODIUM 40 MG: 40 INJECTION SUBCUTANEOUS at 09:09

## 2023-09-12 RX ADMIN — POTASSIUM BICARBONATE 40 MEQ: 391 TABLET, EFFERVESCENT ORAL at 10:09

## 2023-09-12 RX ADMIN — FOLIC ACID: 5 INJECTION, SOLUTION INTRAMUSCULAR; INTRAVENOUS; SUBCUTANEOUS at 06:09

## 2023-09-12 NOTE — DISCHARGE INSTRUCTIONS
It is has been such a pleasure taking care of you, Nayan! I am so glad you're feeling better. I hope you continue to feel stronger every day. Make sure to stay hydrated, progressively advance your diet as you can tolerate it, and use Ondansetron (zofran) as needed for nausea and Omeprazole (prilosec) for heart burn or indigestion. Please do not hesitate to reach out to me with any questions or concerns.     Rica Hull PA-C  [facility] Medicine  [email or web address]  [phone]

## 2023-09-12 NOTE — PLAN OF CARE
Problem: Adult Inpatient Plan of Care  Goal: Plan of Care Review  Outcome: Ongoing, Progressing  Goal: Absence of Hospital-Acquired Illness or Injury  Outcome: Ongoing, Progressing  Goal: Optimal Comfort and Wellbeing  Outcome: Ongoing, Progressing  Goal: Readiness for Transition of Care  Outcome: Ongoing, Progressing     Problem: Bariatric Environmental Safety  Goal: Safety Maintained with Care  Outcome: Ongoing, Progressing     Problem: Infection  Goal: Absence of Infection Signs and Symptoms  Outcome: Ongoing, Progressing     Problem: Fatigue  Goal: Improved Activity Tolerance  Outcome: Ongoing, Progressing     Problem: Bowel Elimination/Continence Impairment  Goal: Effective Bowel Elimination/Continence  Outcome: Ongoing, Progressing

## 2023-09-12 NOTE — PLAN OF CARE
[facility] Observation 11H  Discharge Final Note    Primary Care Provider: No, Primary Doctor    Expected Discharge Date: 9/12/2023    Future Appointments   Date Time Provider Department Center   9/19/2023 10:00 AM Awilda Newberry DNP [facility]     Pt discharged home with no services.  Khoi Manrique, RN,BSN      Final Discharge Note (most recent)       Final Note - 09/12/23 1543          Final Note    Assessment Type Final Discharge Note     Anticipated Discharge Disposition Home or Self Care     Hospital Resources/Appts/Education Provided Provided patient/caregiver with written discharge plan information;Appointments scheduled and added to AVS;Post-Acute resouces added to AVS        Post-Acute Status    Discharge Delays None known at this time                     Important Message from Medicare

## 2023-09-13 LAB
E COLI SXT1 STL QL IA: NEGATIVE
E COLI SXT2 STL QL IA: NEGATIVE

## 2023-09-13 NOTE — DISCHARGE SUMMARY
[facility] [address] [place] [address] Medicine  Discharge Summary      Patient Name: Nayan Rivera  MRN: [id number]  ROSEMARY: [id number]  Patient Class: OP- Observation  Admission Date: 9/10/2023  Hospital Length of Stay: 0 days  Discharge Date and Time: 9/12/2023  5:28 PM  Attending Physician: Pascale att. providers found   Discharging Provider: Rica Hull PA-C  Primary Care Provider: Pascale, Primary Doctor  [facility] Medicine Team: [facility] Rica Hull PA-C  Primary Care Team: [facility]    HPI:   58-year-old female with no significant past medical history presenting with 1 day of nausea, vomiting, and diarrhea.  Patient reports that she had multiple episodes of diarrhea starting today, and then began to have nonbloody vomiting.  She reports she ate sushi 3 days ago, no recent travel, no sick contacts.  Endorses lightheadedness and fatigue.  Denies abdominal pain, chest pain, cough, myalgias, bloody stool.    In the ED patient afebrile and hemodynamically stable saturating well on room air. Persistent nausea and diarrhea while in ED improving with antiemetics. No other sick family members at home including those who also ate the same sushi. K+ 3.2, corrected Ca 8.1, Cl 116, CO2 9. Patient started on aggressive IVFs and electrolyte repletion. Admitted to the care of medicine for further evaluation and management.       * No surgery found *      Hospital Course:   Ms. Rivera was placed in observation for intractable nausea, vomiting, and diarrhea. Pt seen and examined by me this morning. VSSAF. ALVINA since admission. Pt reports significant symptomatic improvement in nausea and vomiting with PRN antiemetics and IVFs. C. Diff negative. She denies abdominal pain and abdominal exam is reassuring.On my evaluation this morning, the patient reports feeling well and is eager to discharge home. All questions were answered. Patient acknowledged understanding of discharge instructions and feels safe to discharge home.  Patient was discharged on 9/12/2023 in stable condition with PCP follow-up. Education regarding condition provided and return precautions given.        Goals of Care Treatment Preferences:  Code Status: Full Code      Consults:     No new Assessment & Plan notes have been filed under this hospital service since the last note was generated.  Service: Hospital Medicine    Final Active Diagnoses:    Diagnosis Date Noted POA    PRINCIPAL PROBLEM:  Nausea vomiting and diarrhea [R11.2, R19.7] 09/11/2023 Unknown    Normal anion gap metabolic acidosis [E87.20] 09/11/2023 Unknown      Problems Resolved During this Admission:       Discharged Condition: good    Disposition: Home or Self Care    Follow Up:    Patient Instructions:      Ambulatory referral/consult to Internal Medicine   Standing Status: Future   Referral Priority: Urgent Referral Type: Consultation   Referral Reason: Specialty Services Required   Requested Specialty: Internal Medicine   Number of Visits Requested: 1     Notify your health care provider if you experience any of the following:  persistent nausea and vomiting or diarrhea     Notify your health care provider if you experience any of the following:  severe uncontrolled pain     Notify your health care provider if you experience any of the following:  temperature >100.4     Activity as tolerated       Significant Diagnostic Studies: N/A    Pending Diagnostic Studies:     Procedure Component Value Units Date/Time    Calprotectin, Stool [[id number]] Collected: 09/11/23 2130    Order Status: Sent Lab Status: In process Updated: 09/11/23 2258    Specimen: Stool     Fecal fat, qualitative [[id number]] Collected: 09/11/23 2130    Order Status: Sent Lab Status: In process Updated: 09/11/23 2300    Specimen: Stool     H. pylori antigen, stool [[id number]] Collected: 09/11/23 2130    Order Status: Sent Lab Status: In process Updated: 09/11/23 2301    Specimen: Stool     Pancreatic elastase, fecal  [[id number]] Collected: 09/11/23 2130    Order Status: Sent Lab Status: In process Updated: 09/11/23 2300    Specimen: Stool     Stool Exam-Ova,Cysts,Parasites [[id number]] Collected: 09/11/23 2130    Order Status: Sent Lab Status: In process Updated: 09/11/23 2258    Specimen: Stool          Medications:  Reconciled Home Medications:      Medication List      START taking these medications    omeprazole 40 MG capsule  Commonly known as: PRILOSEC  Take 1 capsule (40 mg total) by mouth 2 (two) times daily as needed (indigestion/heartburn).     ondansetron 4 MG tablet  Commonly known as: ZOFRAN  Take 1-2 tablets (4-8 mg total) by mouth every 6 (six) hours as needed for Nausea.            Indwelling Lines/Drains at time of discharge:   Lines/Drains/Airways     None                 Time spent on the discharge of patient: 36 minutes         Rica Hull PA-C  Department of Hospital Medicine  [facility]

## 2023-09-14 LAB
BACTERIA STL CULT: NORMAL
FAT STL QL: NORMAL
NEUTRAL FAT STL QL: NORMAL
O+P STL MICRO: NORMAL

## 2023-09-15 LAB
CALPROTECTIN STL-MCNT: 119.6 MCG/G
ELASTASE 1, FECAL: 146 MCG/G

## 2023-09-21 LAB — H PYLORI AG STL QL IA: NOT DETECTED
